# Patient Record
Sex: MALE | Race: OTHER | NOT HISPANIC OR LATINO
[De-identification: names, ages, dates, MRNs, and addresses within clinical notes are randomized per-mention and may not be internally consistent; named-entity substitution may affect disease eponyms.]

---

## 2017-01-19 ENCOUNTER — RX RENEWAL (OUTPATIENT)
Age: 62
End: 2017-01-19

## 2017-01-25 ENCOUNTER — RX RENEWAL (OUTPATIENT)
Age: 62
End: 2017-01-25

## 2017-03-17 ENCOUNTER — RX RENEWAL (OUTPATIENT)
Age: 62
End: 2017-03-17

## 2017-06-08 ENCOUNTER — CLINICAL ADVICE (OUTPATIENT)
Age: 62
End: 2017-06-08

## 2017-06-25 ENCOUNTER — APPOINTMENT (OUTPATIENT)
Dept: MRI IMAGING | Facility: HOSPITAL | Age: 62
End: 2017-06-25

## 2017-06-25 ENCOUNTER — OUTPATIENT (OUTPATIENT)
Dept: OUTPATIENT SERVICES | Facility: HOSPITAL | Age: 62
LOS: 1 days | End: 2017-06-25
Payer: COMMERCIAL

## 2017-06-25 DIAGNOSIS — D35.2 BENIGN NEOPLASM OF PITUITARY GLAND: ICD-10-CM

## 2017-06-25 DIAGNOSIS — R22.9 LOCALIZED SWELLING, MASS AND LUMP, UNSPECIFIED: ICD-10-CM

## 2017-06-25 PROCEDURE — 72146 MRI CHEST SPINE W/O DYE: CPT

## 2017-06-25 PROCEDURE — 72141 MRI NECK SPINE W/O DYE: CPT

## 2017-07-11 ENCOUNTER — APPOINTMENT (OUTPATIENT)
Dept: INTERNAL MEDICINE | Facility: CLINIC | Age: 62
End: 2017-07-11

## 2017-07-11 ENCOUNTER — NON-APPOINTMENT (OUTPATIENT)
Age: 62
End: 2017-07-11

## 2017-07-11 VITALS
OXYGEN SATURATION: 98 % | DIASTOLIC BLOOD PRESSURE: 80 MMHG | SYSTOLIC BLOOD PRESSURE: 120 MMHG | WEIGHT: 154 LBS | TEMPERATURE: 97.8 F | RESPIRATION RATE: 18 BRPM | HEART RATE: 65 BPM | BODY MASS INDEX: 28.34 KG/M2 | HEIGHT: 62 IN

## 2017-07-11 RX ORDER — GABAPENTIN 100 MG/1
100 CAPSULE ORAL
Qty: 90 | Refills: 0 | Status: DISCONTINUED | COMMUNITY
Start: 2017-07-06 | End: 2017-07-11

## 2017-07-12 LAB
25(OH)D3 SERPL-MCNC: 25.2 NG/ML
ALBUMIN SERPL ELPH-MCNC: 4.6 G/DL
ALP BLD-CCNC: 58 U/L
ALT SERPL-CCNC: 32 U/L
ANION GAP SERPL CALC-SCNC: 18 MMOL/L
APPEARANCE: CLEAR
AST SERPL-CCNC: 24 U/L
BASOPHILS # BLD AUTO: 0.01 K/UL
BASOPHILS NFR BLD AUTO: 0.2 %
BILIRUB SERPL-MCNC: 1.4 MG/DL
BILIRUBIN URINE: NEGATIVE
BLOOD URINE: NEGATIVE
BUN SERPL-MCNC: 16 MG/DL
CALCIUM SERPL-MCNC: 9.6 MG/DL
CHLORIDE SERPL-SCNC: 100 MMOL/L
CHOLEST SERPL-MCNC: 154 MG/DL
CHOLEST/HDLC SERPL: 3.9 RATIO
CO2 SERPL-SCNC: 24 MMOL/L
COLOR: YELLOW
CREAT SERPL-MCNC: 0.99 MG/DL
CREAT SPEC-SCNC: 151 MG/DL
EOSINOPHIL # BLD AUTO: 0.17 K/UL
EOSINOPHIL NFR BLD AUTO: 3 %
ERYTHROCYTE [SEDIMENTATION RATE] IN BLOOD BY WESTERGREN METHOD: 2 MM/HR
FOLATE SERPL-MCNC: >20 NG/ML
GGT SERPL-CCNC: 50 U/L
GLUCOSE QUALITATIVE U: NORMAL MG/DL
GLUCOSE SERPL-MCNC: 81 MG/DL
HBA1C MFR BLD HPLC: 4.8 %
HCT VFR BLD CALC: 44 %
HDLC SERPL-MCNC: 40 MG/DL
HGB BLD-MCNC: 14.4 G/DL
IMM GRANULOCYTES NFR BLD AUTO: 0 %
IRON SATN MFR SERPL: 42 %
IRON SERPL-MCNC: 123 UG/DL
KETONES URINE: NEGATIVE
LDLC SERPL CALC-MCNC: 79 MG/DL
LEUKOCYTE ESTERASE URINE: NEGATIVE
LYMPHOCYTES # BLD AUTO: 2.2 K/UL
LYMPHOCYTES NFR BLD AUTO: 38.3 %
MAN DIFF?: NORMAL
MCHC RBC-ENTMCNC: 30.6 PG
MCHC RBC-ENTMCNC: 32.7 GM/DL
MCV RBC AUTO: 93.4 FL
MICROALBUMIN 24H UR DL<=1MG/L-MCNC: 0.5 MG/DL
MICROALBUMIN/CREAT 24H UR-RTO: 3 MG/G
MONOCYTES # BLD AUTO: 0.46 K/UL
MONOCYTES NFR BLD AUTO: 8 %
NEUTROPHILS # BLD AUTO: 2.91 K/UL
NEUTROPHILS NFR BLD AUTO: 50.5 %
NITRITE URINE: NEGATIVE
PH URINE: 6
PLATELET # BLD AUTO: 156 K/UL
POTASSIUM SERPL-SCNC: 4.3 MMOL/L
PROT SERPL-MCNC: 7.1 G/DL
PROTEIN URINE: NEGATIVE MG/DL
PSA FREE FLD-MCNC: 43.8
PSA FREE SERPL-MCNC: 0.32 NG/ML
PSA SERPL-MCNC: 0.73 NG/ML
RBC # BLD: 4.71 M/UL
RBC # FLD: 12.2 %
SODIUM SERPL-SCNC: 142 MMOL/L
SPECIFIC GRAVITY URINE: 1.02
T3 SERPL-MCNC: 102 NG/DL
T4 FREE SERPL-MCNC: 1.2 NG/DL
TIBC SERPL-MCNC: 291 UG/DL
TRIGL SERPL-MCNC: 176 MG/DL
TSH SERPL-ACNC: 1.13 UIU/ML
UIBC SERPL-MCNC: 168 UG/DL
UROBILINOGEN URINE: NORMAL MG/DL
VIT B12 SERPL-MCNC: 443 PG/ML
WBC # FLD AUTO: 5.75 K/UL

## 2017-08-02 LAB — HEMOCCULT STL QL IA: NEGATIVE

## 2017-08-10 ENCOUNTER — APPOINTMENT (OUTPATIENT)
Dept: CARDIOLOGY | Facility: CLINIC | Age: 62
End: 2017-08-10
Payer: COMMERCIAL

## 2017-08-10 VITALS
RESPIRATION RATE: 16 BRPM | DIASTOLIC BLOOD PRESSURE: 79 MMHG | BODY MASS INDEX: 28.52 KG/M2 | OXYGEN SATURATION: 99 % | WEIGHT: 155 LBS | HEIGHT: 62 IN | SYSTOLIC BLOOD PRESSURE: 120 MMHG | HEART RATE: 62 BPM

## 2017-08-10 PROCEDURE — 99204 OFFICE O/P NEW MOD 45 MIN: CPT

## 2017-09-20 ENCOUNTER — OUTPATIENT (OUTPATIENT)
Dept: OUTPATIENT SERVICES | Facility: HOSPITAL | Age: 62
LOS: 1 days | End: 2017-09-20
Payer: COMMERCIAL

## 2017-09-20 ENCOUNTER — APPOINTMENT (OUTPATIENT)
Dept: CARDIOLOGY | Facility: CLINIC | Age: 62
End: 2017-09-20

## 2017-09-20 DIAGNOSIS — Z00.8 ENCOUNTER FOR OTHER GENERAL EXAMINATION: ICD-10-CM

## 2017-09-20 PROCEDURE — 93016 CV STRESS TEST SUPVJ ONLY: CPT

## 2017-09-20 PROCEDURE — 93018 CV STRESS TEST I&R ONLY: CPT

## 2017-09-20 PROCEDURE — 93306 TTE W/DOPPLER COMPLETE: CPT

## 2017-09-20 PROCEDURE — A9500: CPT

## 2017-09-20 PROCEDURE — 78452 HT MUSCLE IMAGE SPECT MULT: CPT

## 2017-09-20 PROCEDURE — 93017 CV STRESS TEST TRACING ONLY: CPT

## 2017-09-20 PROCEDURE — 78452 HT MUSCLE IMAGE SPECT MULT: CPT | Mod: 26

## 2017-09-20 PROCEDURE — 93306 TTE W/DOPPLER COMPLETE: CPT | Mod: 26

## 2017-09-23 ENCOUNTER — RX RENEWAL (OUTPATIENT)
Age: 62
End: 2017-09-23

## 2017-10-05 ENCOUNTER — APPOINTMENT (OUTPATIENT)
Dept: CARDIOLOGY | Facility: CLINIC | Age: 62
End: 2017-10-05

## 2017-10-13 ENCOUNTER — APPOINTMENT (OUTPATIENT)
Dept: CARDIOLOGY | Facility: CLINIC | Age: 62
End: 2017-10-13
Payer: COMMERCIAL

## 2017-10-13 VITALS
SYSTOLIC BLOOD PRESSURE: 122 MMHG | OXYGEN SATURATION: 98 % | WEIGHT: 165 LBS | RESPIRATION RATE: 16 BRPM | BODY MASS INDEX: 30.36 KG/M2 | HEART RATE: 80 BPM | DIASTOLIC BLOOD PRESSURE: 80 MMHG | HEIGHT: 62 IN

## 2017-10-13 PROCEDURE — 93227 XTRNL ECG REC<48 HR R&I: CPT

## 2017-10-13 PROCEDURE — 99214 OFFICE O/P EST MOD 30 MIN: CPT

## 2017-10-13 RX ORDER — AMITRIPTYLINE HYDROCHLORIDE 10 MG/1
10 TABLET, FILM COATED ORAL
Qty: 30 | Refills: 0 | Status: DISCONTINUED | COMMUNITY
Start: 2017-09-07

## 2017-10-13 RX ORDER — AMOXICILLIN 500 MG/1
500 CAPSULE ORAL
Qty: 24 | Refills: 0 | Status: DISCONTINUED | COMMUNITY
Start: 2017-09-19

## 2018-01-10 RX ORDER — MELOXICAM 7.5 MG/1
7.5 TABLET ORAL
Qty: 30 | Refills: 0 | Status: DISCONTINUED | COMMUNITY
Start: 2017-09-17 | End: 2018-01-10

## 2018-01-11 ENCOUNTER — APPOINTMENT (OUTPATIENT)
Dept: INTERNAL MEDICINE | Facility: CLINIC | Age: 63
End: 2018-01-11
Payer: COMMERCIAL

## 2018-01-11 VITALS
DIASTOLIC BLOOD PRESSURE: 80 MMHG | BODY MASS INDEX: 27.23 KG/M2 | WEIGHT: 148 LBS | HEIGHT: 62 IN | OXYGEN SATURATION: 98 % | SYSTOLIC BLOOD PRESSURE: 120 MMHG | TEMPERATURE: 98.3 F | HEART RATE: 78 BPM | RESPIRATION RATE: 16 BRPM

## 2018-01-11 DIAGNOSIS — M19.90 UNSPECIFIED OSTEOARTHRITIS, UNSPECIFIED SITE: ICD-10-CM

## 2018-01-11 PROCEDURE — 99214 OFFICE O/P EST MOD 30 MIN: CPT

## 2018-01-14 LAB
ALBUMIN SERPL ELPH-MCNC: 4.3 G/DL
ALP BLD-CCNC: 54 U/L
ALT SERPL-CCNC: 33 U/L
ANION GAP SERPL CALC-SCNC: 13 MMOL/L
AST SERPL-CCNC: 26 U/L
BASOPHILS # BLD AUTO: 0.02 K/UL
BASOPHILS NFR BLD AUTO: 0.4 %
BILIRUB SERPL-MCNC: 1 MG/DL
BUN SERPL-MCNC: 20 MG/DL
CALCIUM SERPL-MCNC: 9.4 MG/DL
CHLORIDE SERPL-SCNC: 102 MMOL/L
CHOLEST SERPL-MCNC: 150 MG/DL
CHOLEST/HDLC SERPL: 3.5 RATIO
CO2 SERPL-SCNC: 25 MMOL/L
CREAT SERPL-MCNC: 1.05 MG/DL
EOSINOPHIL # BLD AUTO: 0.11 K/UL
EOSINOPHIL NFR BLD AUTO: 2.3 %
GGT SERPL-CCNC: 44 U/L
GLUCOSE SERPL-MCNC: 103 MG/DL
HBA1C MFR BLD HPLC: 4.4 %
HCT VFR BLD CALC: 44.8 %
HDLC SERPL-MCNC: 43 MG/DL
HGB BLD-MCNC: 14.8 G/DL
IMM GRANULOCYTES NFR BLD AUTO: 0.2 %
LDLC SERPL CALC-MCNC: 90 MG/DL
LYMPHOCYTES # BLD AUTO: 1.74 K/UL
LYMPHOCYTES NFR BLD AUTO: 37 %
MAN DIFF?: NORMAL
MCHC RBC-ENTMCNC: 31.4 PG
MCHC RBC-ENTMCNC: 33 GM/DL
MCV RBC AUTO: 94.9 FL
MONOCYTES # BLD AUTO: 0.46 K/UL
MONOCYTES NFR BLD AUTO: 9.8 %
NEUTROPHILS # BLD AUTO: 2.36 K/UL
NEUTROPHILS NFR BLD AUTO: 50.3 %
PLATELET # BLD AUTO: 136 K/UL
POTASSIUM SERPL-SCNC: 4.9 MMOL/L
PROT SERPL-MCNC: 6.6 G/DL
RBC # BLD: 4.72 M/UL
RBC # FLD: 11.9 %
SODIUM SERPL-SCNC: 140 MMOL/L
TRIGL SERPL-MCNC: 87 MG/DL
WBC # FLD AUTO: 4.7 K/UL

## 2018-03-21 ENCOUNTER — CLINICAL ADVICE (OUTPATIENT)
Age: 63
End: 2018-03-21

## 2018-03-22 ENCOUNTER — RX RENEWAL (OUTPATIENT)
Age: 63
End: 2018-03-22

## 2018-03-28 ENCOUNTER — APPOINTMENT (OUTPATIENT)
Dept: ORTHOPEDIC SURGERY | Facility: CLINIC | Age: 63
End: 2018-03-28
Payer: COMMERCIAL

## 2018-03-28 VITALS
BODY MASS INDEX: 27.6 KG/M2 | HEIGHT: 62 IN | SYSTOLIC BLOOD PRESSURE: 119 MMHG | WEIGHT: 150 LBS | DIASTOLIC BLOOD PRESSURE: 76 MMHG | HEART RATE: 67 BPM

## 2018-03-28 DIAGNOSIS — Z80.9 FAMILY HISTORY OF MALIGNANT NEOPLASM, UNSPECIFIED: ICD-10-CM

## 2018-03-28 DIAGNOSIS — Z82.61 FAMILY HISTORY OF ARTHRITIS: ICD-10-CM

## 2018-03-28 PROCEDURE — 99204 OFFICE O/P NEW MOD 45 MIN: CPT

## 2018-04-07 ENCOUNTER — APPOINTMENT (OUTPATIENT)
Dept: ORTHOPEDIC SURGERY | Facility: CLINIC | Age: 63
End: 2018-04-07
Payer: COMMERCIAL

## 2018-04-07 VITALS
BODY MASS INDEX: 27.6 KG/M2 | WEIGHT: 150 LBS | HEART RATE: 55 BPM | SYSTOLIC BLOOD PRESSURE: 123 MMHG | DIASTOLIC BLOOD PRESSURE: 79 MMHG | HEIGHT: 62 IN

## 2018-04-07 PROCEDURE — 72040 X-RAY EXAM NECK SPINE 2-3 VW: CPT

## 2018-04-07 PROCEDURE — 72100 X-RAY EXAM L-S SPINE 2/3 VWS: CPT

## 2018-04-07 PROCEDURE — 99214 OFFICE O/P EST MOD 30 MIN: CPT

## 2018-04-16 ENCOUNTER — RX RENEWAL (OUTPATIENT)
Age: 63
End: 2018-04-16

## 2018-05-23 ENCOUNTER — CLINICAL ADVICE (OUTPATIENT)
Age: 63
End: 2018-05-23

## 2018-06-04 ENCOUNTER — APPOINTMENT (OUTPATIENT)
Dept: NEUROLOGY | Facility: CLINIC | Age: 63
End: 2018-06-04

## 2018-06-06 ENCOUNTER — APPOINTMENT (OUTPATIENT)
Dept: ORTHOPEDIC SURGERY | Facility: CLINIC | Age: 63
End: 2018-06-06

## 2018-06-06 ENCOUNTER — APPOINTMENT (OUTPATIENT)
Dept: ORTHOPEDIC SURGERY | Facility: CLINIC | Age: 63
End: 2018-06-06
Payer: COMMERCIAL

## 2018-06-06 VITALS
HEART RATE: 60 BPM | HEIGHT: 62 IN | WEIGHT: 150 LBS | DIASTOLIC BLOOD PRESSURE: 78 MMHG | BODY MASS INDEX: 27.6 KG/M2 | SYSTOLIC BLOOD PRESSURE: 121 MMHG

## 2018-06-06 DIAGNOSIS — G56.00 CARPAL TUNNEL SYNDROME, UNSPECIFIED UPPER LIMB: ICD-10-CM

## 2018-06-06 PROCEDURE — 99214 OFFICE O/P EST MOD 30 MIN: CPT

## 2018-06-08 ENCOUNTER — APPOINTMENT (OUTPATIENT)
Dept: ORTHOPEDIC SURGERY | Facility: CLINIC | Age: 63
End: 2018-06-08

## 2018-06-10 LAB
25(OH)D3 SERPL-MCNC: 30.6 NG/ML
ALBUMIN SERPL ELPH-MCNC: 4.3 G/DL
ALP BLD-CCNC: 50 U/L
ALT SERPL-CCNC: 35 U/L
ANION GAP SERPL CALC-SCNC: 12 MMOL/L
APPEARANCE: CLEAR
AST SERPL-CCNC: 28 U/L
BASOPHILS # BLD AUTO: 0.02 K/UL
BASOPHILS NFR BLD AUTO: 0.4 %
BILIRUB SERPL-MCNC: 0.9 MG/DL
BILIRUBIN URINE: NEGATIVE
BLOOD URINE: NEGATIVE
BUN SERPL-MCNC: 19 MG/DL
CALCIUM SERPL-MCNC: 9.5 MG/DL
CHLORIDE SERPL-SCNC: 103 MMOL/L
CHOLEST SERPL-MCNC: 186 MG/DL
CHOLEST/HDLC SERPL: 4.8 RATIO
CO2 SERPL-SCNC: 25 MMOL/L
COLOR: YELLOW
CREAT SERPL-MCNC: 0.95 MG/DL
CREAT SPEC-SCNC: 64 MG/DL
EOSINOPHIL # BLD AUTO: 0.11 K/UL
EOSINOPHIL NFR BLD AUTO: 2.2 %
ERYTHROCYTE [SEDIMENTATION RATE] IN BLOOD BY WESTERGREN METHOD: 2 MM/HR
FOLATE SERPL-MCNC: 12.6 NG/ML
GGT SERPL-CCNC: 42 U/L
GLUCOSE QUALITATIVE U: NEGATIVE MG/DL
GLUCOSE SERPL-MCNC: 92 MG/DL
HBA1C MFR BLD HPLC: 4.8 %
HCT VFR BLD CALC: 45.1 %
HCV AB SER QL: NONREACTIVE
HCV S/CO RATIO: 0.16 S/CO
HDLC SERPL-MCNC: 39 MG/DL
HGB BLD-MCNC: 15 G/DL
IMM GRANULOCYTES NFR BLD AUTO: 0.2 %
IRON SATN MFR SERPL: 40 %
IRON SERPL-MCNC: 106 UG/DL
KETONES URINE: NEGATIVE
LDLC SERPL CALC-MCNC: 124 MG/DL
LEUKOCYTE ESTERASE URINE: NEGATIVE
LYMPHOCYTES # BLD AUTO: 1.51 K/UL
LYMPHOCYTES NFR BLD AUTO: 30.6 %
MAN DIFF?: NORMAL
MCHC RBC-ENTMCNC: 31.5 PG
MCHC RBC-ENTMCNC: 33.3 GM/DL
MCV RBC AUTO: 94.7 FL
MICROALBUMIN 24H UR DL<=1MG/L-MCNC: <0.3 MG/DL
MICROALBUMIN/CREAT 24H UR-RTO: NORMAL
MONOCYTES # BLD AUTO: 0.43 K/UL
MONOCYTES NFR BLD AUTO: 8.7 %
NEUTROPHILS # BLD AUTO: 2.85 K/UL
NEUTROPHILS NFR BLD AUTO: 57.9 %
NITRITE URINE: NEGATIVE
PH URINE: 6
PLATELET # BLD AUTO: 134 K/UL
POTASSIUM SERPL-SCNC: 5 MMOL/L
PROT SERPL-MCNC: 6.9 G/DL
PROTEIN URINE: NEGATIVE MG/DL
PSA FREE FLD-MCNC: 39.8
PSA FREE SERPL-MCNC: 0.35 NG/ML
PSA SERPL-MCNC: 0.88 NG/ML
RBC # BLD: 4.76 M/UL
RBC # FLD: 11.9 %
SODIUM SERPL-SCNC: 140 MMOL/L
SPECIFIC GRAVITY URINE: 1.01
T3 SERPL-MCNC: 90 NG/DL
T4 FREE SERPL-MCNC: 1.3 NG/DL
TIBC SERPL-MCNC: 264 UG/DL
TRIGL SERPL-MCNC: 113 MG/DL
TSH SERPL-ACNC: 1.14 UIU/ML
UIBC SERPL-MCNC: 158 UG/DL
UROBILINOGEN URINE: NEGATIVE MG/DL
VIT B12 SERPL-MCNC: 371 PG/ML
WBC # FLD AUTO: 4.93 K/UL

## 2018-06-15 ENCOUNTER — RX RENEWAL (OUTPATIENT)
Age: 63
End: 2018-06-15

## 2018-06-18 ENCOUNTER — APPOINTMENT (OUTPATIENT)
Dept: ORTHOPEDIC SURGERY | Facility: CLINIC | Age: 63
End: 2018-06-18
Payer: COMMERCIAL

## 2018-06-18 VITALS
BODY MASS INDEX: 27.05 KG/M2 | WEIGHT: 147 LBS | HEIGHT: 62 IN | HEART RATE: 60 BPM | DIASTOLIC BLOOD PRESSURE: 75 MMHG | SYSTOLIC BLOOD PRESSURE: 117 MMHG

## 2018-06-18 PROCEDURE — 99213 OFFICE O/P EST LOW 20 MIN: CPT

## 2018-07-20 ENCOUNTER — APPOINTMENT (OUTPATIENT)
Dept: INTERNAL MEDICINE | Facility: CLINIC | Age: 63
End: 2018-07-20
Payer: COMMERCIAL

## 2018-07-20 VITALS
OXYGEN SATURATION: 98 % | TEMPERATURE: 98.2 F | BODY MASS INDEX: 27.05 KG/M2 | RESPIRATION RATE: 16 BRPM | HEIGHT: 62 IN | SYSTOLIC BLOOD PRESSURE: 120 MMHG | DIASTOLIC BLOOD PRESSURE: 80 MMHG | HEART RATE: 65 BPM | WEIGHT: 147 LBS

## 2018-07-20 PROCEDURE — 99214 OFFICE O/P EST MOD 30 MIN: CPT

## 2018-07-20 RX ORDER — CHLORHEXIDINE GLUCONATE, 0.12% ORAL RINSE 1.2 MG/ML
0.12 SOLUTION DENTAL
Qty: 473 | Refills: 0 | Status: DISCONTINUED | COMMUNITY
Start: 2018-02-25 | End: 2018-07-20

## 2018-07-20 NOTE — HISTORY OF PRESENT ILLNESS
[Cold Symptoms] : cold symptoms [Moderate] : moderate [___ Days ago] : [unfilled] days ago [Constant] : constant [Congestion] : congestion [Cough] : cough [Sore Throat] : sore throat [Chills] : chills [Fatigue] : fatigue [Fever] : fever [Rest] : rest [Activity] : with activity [Worsening] : worsening [Wheezing] : no wheezing [Anorexia] : no anorexia [Shortness Of Breath] : no shortness of breath [Earache] : no earache [Headache] : no headache

## 2018-07-20 NOTE — REVIEW OF SYSTEMS
[Sore Throat] : sore throat [Cough] : cough [Negative] : Heme/Lymph [Shortness Of Breath] : no shortness of breath [Wheezing] : no wheezing

## 2018-07-20 NOTE — ASSESSMENT
[FreeTextEntry1] : 63 year old male found to have stable Hypercholesterolemia, GERD, Vitamin D Deficiency,with the current regimen, diet and life style modifications, as counseled. Prior results reviewed and discussed with the patient during today's examination. Plan as ordered.\par Current symptoms are consistent with Acute Bronchitis , prescription management and further followup as ordered. If symptoms get worse, consider adding Prednisone, as counseled.\par \par

## 2018-07-20 NOTE — HEALTH RISK ASSESSMENT
[No falls in past year] : Patient reported no falls in the past year [0] : 2) Feeling down, depressed, or hopeless: Not at all (0) [Discussed at today's visit] : Advance Directives Discussed at today's visit [Fully functional (bathing, dressing, toileting, transferring, walking, feeding)] : Fully functional (bathing, dressing, toileting, transferring, walking, feeding) [Fully functional (using the telephone, shopping, preparing meals, housekeeping, doing laundry, using] : Fully functional and needs no help or supervision to perform IADLs (using the telephone, shopping, preparing meals, housekeeping, doing laundry, using transportation, managing medications and managing finances) [] : No [de-identified] : ORTHO [[id number]] : 0

## 2018-07-28 ENCOUNTER — APPOINTMENT (OUTPATIENT)
Dept: INTERNAL MEDICINE | Facility: CLINIC | Age: 63
End: 2018-07-28

## 2018-09-21 ENCOUNTER — RX RENEWAL (OUTPATIENT)
Age: 63
End: 2018-09-21

## 2018-09-28 ENCOUNTER — EMERGENCY (EMERGENCY)
Facility: HOSPITAL | Age: 63
LOS: 1 days | Discharge: HOME | End: 2018-09-28
Attending: EMERGENCY MEDICINE | Admitting: EMERGENCY MEDICINE
Payer: COMMERCIAL

## 2018-09-28 VITALS
RESPIRATION RATE: 18 BRPM | SYSTOLIC BLOOD PRESSURE: 142 MMHG | OXYGEN SATURATION: 99 % | DIASTOLIC BLOOD PRESSURE: 92 MMHG | TEMPERATURE: 98 F | HEART RATE: 62 BPM

## 2018-09-28 VITALS
TEMPERATURE: 98 F | DIASTOLIC BLOOD PRESSURE: 88 MMHG | HEART RATE: 69 BPM | OXYGEN SATURATION: 98 % | RESPIRATION RATE: 16 BRPM | SYSTOLIC BLOOD PRESSURE: 170 MMHG

## 2018-09-28 PROCEDURE — 99283 EMERGENCY DEPT VISIT LOW MDM: CPT

## 2018-09-28 RX ORDER — ACETAMINOPHEN 500 MG
650 TABLET ORAL ONCE
Qty: 0 | Refills: 0 | Status: COMPLETED | OUTPATIENT
Start: 2018-09-28 | End: 2018-09-28

## 2018-09-28 RX ORDER — KETOROLAC TROMETHAMINE 30 MG/ML
30 SYRINGE (ML) INJECTION ONCE
Qty: 0 | Refills: 0 | Status: DISCONTINUED | OUTPATIENT
Start: 2018-09-28 | End: 2018-09-28

## 2018-09-28 RX ADMIN — Medication 40 MILLIGRAM(S): at 17:25

## 2018-09-28 RX ADMIN — Medication 650 MILLIGRAM(S): at 16:01

## 2018-09-28 RX ADMIN — Medication 30 MILLIGRAM(S): at 16:01

## 2018-09-28 NOTE — ED PROVIDER NOTE - CARE PLAN
Principal Discharge DX:	Back pain  Assessment and plan of treatment:	Follow up with your Primary Medical Doctor in 1-2 days.  Follow up with your Neurologist and pain management doctor in 1-2 days.  Heat to back.  Take Prednisone 40mg orally once a day x 4 days next dose tomorrow 9/29.  Take Tylenol 650mg orally every 6 hours as needed for pain.  Return to the ER for any persistent/worsening or new symptoms weakness, numbness, fecal/urinary incontinence or any concerning symptoms.

## 2018-09-28 NOTE — ED PROVIDER NOTE - MEDICAL DECISION MAKING DETAILS
63y M with low back pain. Nml neuro exam. Will give pain medications. Need outpt follow up. Pt also endorses high BP and needs follow up. Will call neurologist Dr. Valeriy Long

## 2018-09-28 NOTE — ED PROVIDER NOTE - PLAN OF CARE
Follow up with your Primary Medical Doctor in 1-2 days.  Follow up with your Neurologist and pain management doctor in 1-2 days.  Heat to back.  Take Prednisone 40mg orally once a day x 4 days next dose tomorrow 9/29.  Take Tylenol 650mg orally every 6 hours as needed for pain.  Return to the ER for any persistent/worsening or new symptoms weakness, numbness, fecal/urinary incontinence or any concerning symptoms.

## 2018-09-28 NOTE — ED PROVIDER NOTE - MUSCULOSKELETAL MINIMAL EXAM
Ambulatory. Back and neck nontender to palpation. Distal neurovascularly intact. Pt has circular marks in back from cupping from acupuncturist. Straight leg raise nml

## 2018-09-28 NOTE — ED PROVIDER NOTE - OBJECTIVE STATEMENT
63y M with PMHx HLD presents to the ED for low back pain since Monday. Having difficulty ambulating. No trauma. Diagnosed with pinched nerve previously and has had numbness and weakness in left hand since last year. No smoke and social drinker. Family hx of arthritis. No bowel or urinary dysfunction 63y M with PMHx HLD presents to the ED for low back pain since Monday. Having difficulty ambulating. No trauma. Diagnosed with pinched nerve from MRI previously and has had numbness and weakness in left hand since last year. No smoke and social drinker. Family hx of arthritis. No bowel or urinary dysfunction 63y M with PMHx HLD presents to the ED for low back pain since Monday. Having difficulty ambulating. No trauma. Diagnosed with pinched nerve from MRI previously and has had numbness and weakness in left hand since last year. No smoke and social drinker. Family hx of arthritis. No bowel or urinary dysfunction. Previous MRI from May shows canal narrowing in the cervical spine. Lumbar spine MRI shows spinal stenosis. Left elbow MRI which shows mild arthrosis

## 2018-09-28 NOTE — ED PROVIDER NOTE - PROGRESS NOTE DETAILS
Dr. Long paged. Waiting for call back ED Attending: Kobi  Pt signed out to me from Dr. Matute to f/u on pain and reassess.  Pt feeling somewhat better, still having pain down right leg and paresthesias.  RLE extension limited by pain, but drossly normal. Otherwise denies any specific neuro deficits.  RLE pain is new for him despite h/o back issues.  We recc MRI/CDU but pt declined.  States he can tolerate sx and will c/w Tylenol for now, also rx prednisone here.  He is scheduled to see his Physiatrist Monday at 9:30 and will be able to get MRI at same facility. Understands warming signs for return.  DC instructions given by LUIS ENRIQUE Castillo. LUIS ENRIQUE Castillo:(QUJOSE DUDLEY) pt feels slightly improved ambulating without assistance.  Pt offered and declined admission to CDU for MRI; pt has a follow up appointment on Monday with his doctor.  Rx for Prednisone sent to pt's pharmacy.  Discharge reviewed and discussed with patient.  Pt given strict return instructions.

## 2018-10-10 ENCOUNTER — MOBILE ON CALL (OUTPATIENT)
Age: 63
End: 2018-10-10

## 2018-10-22 ENCOUNTER — APPOINTMENT (OUTPATIENT)
Dept: ORTHOPEDIC SURGERY | Facility: CLINIC | Age: 63
End: 2018-10-22
Payer: COMMERCIAL

## 2018-10-22 VITALS — DIASTOLIC BLOOD PRESSURE: 84 MMHG | SYSTOLIC BLOOD PRESSURE: 139 MMHG | HEART RATE: 87 BPM

## 2018-10-22 DIAGNOSIS — G95.9 DISEASE OF SPINAL CORD, UNSPECIFIED: ICD-10-CM

## 2018-10-22 PROCEDURE — 72170 X-RAY EXAM OF PELVIS: CPT | Mod: 59

## 2018-10-22 PROCEDURE — 72110 X-RAY EXAM L-2 SPINE 4/>VWS: CPT

## 2018-10-22 PROCEDURE — 99214 OFFICE O/P EST MOD 30 MIN: CPT

## 2018-10-30 ENCOUNTER — APPOINTMENT (OUTPATIENT)
Dept: ORTHOPEDIC SURGERY | Facility: CLINIC | Age: 63
End: 2018-10-30
Payer: COMMERCIAL

## 2018-10-30 VITALS
WEIGHT: 148 LBS | BODY MASS INDEX: 27.23 KG/M2 | SYSTOLIC BLOOD PRESSURE: 121 MMHG | HEART RATE: 86 BPM | HEIGHT: 62 IN | DIASTOLIC BLOOD PRESSURE: 80 MMHG

## 2018-10-30 PROCEDURE — 99214 OFFICE O/P EST MOD 30 MIN: CPT

## 2018-11-30 ENCOUNTER — APPOINTMENT (OUTPATIENT)
Dept: ORTHOPEDIC SURGERY | Facility: HOSPITAL | Age: 63
End: 2018-11-30
Payer: COMMERCIAL

## 2018-11-30 VITALS
BODY MASS INDEX: 25.61 KG/M2 | WEIGHT: 150 LBS | HEIGHT: 64 IN | SYSTOLIC BLOOD PRESSURE: 110 MMHG | HEART RATE: 76 BPM | OXYGEN SATURATION: 97 % | DIASTOLIC BLOOD PRESSURE: 80 MMHG

## 2018-11-30 DIAGNOSIS — M48.02 SPINAL STENOSIS, CERVICAL REGION: ICD-10-CM

## 2018-11-30 DIAGNOSIS — M54.16 RADICULOPATHY, LUMBAR REGION: ICD-10-CM

## 2018-11-30 DIAGNOSIS — G56.22 LESION OF ULNAR NERVE, LEFT UPPER LIMB: ICD-10-CM

## 2018-11-30 PROCEDURE — 99215 OFFICE O/P EST HI 40 MIN: CPT

## 2018-11-30 RX ORDER — PROMETHAZINE HYDROCHLORIDE AND DEXTROMETHORPHAN HYDROBROMIDE ORAL SOLUTION 15; 6.25 MG/5ML; MG/5ML
6.25-15 SOLUTION ORAL
Qty: 210 | Refills: 2 | Status: DISCONTINUED | COMMUNITY
Start: 2018-07-20 | End: 2018-11-30

## 2018-11-30 RX ORDER — DOXYCYCLINE HYCLATE 100 MG/1
100 CAPSULE ORAL
Qty: 14 | Refills: 0 | Status: DISCONTINUED | COMMUNITY
Start: 2018-07-20 | End: 2018-11-30

## 2018-11-30 RX ORDER — ASPIRIN 81 MG
81 TABLET, DELAYED RELEASE (ENTERIC COATED) ORAL DAILY
Qty: 90 | Refills: 1 | Status: DISCONTINUED | COMMUNITY
Start: 2017-10-13 | End: 2018-11-30

## 2018-12-03 PROBLEM — G56.22 ULNAR NERVE ENTRAPMENT, LEFT: Status: ACTIVE | Noted: 2018-12-03

## 2018-12-03 PROBLEM — M54.16 LEFT LUMBAR RADICULOPATHY: Status: ACTIVE | Noted: 2018-10-22

## 2018-12-03 PROBLEM — M48.02 CERVICAL STENOSIS OF SPINE: Status: ACTIVE | Noted: 2018-04-07

## 2018-12-14 ENCOUNTER — APPOINTMENT (OUTPATIENT)
Dept: ORTHOPEDIC SURGERY | Facility: CLINIC | Age: 63
End: 2018-12-14
Payer: MEDICARE

## 2018-12-14 ENCOUNTER — CLINICAL ADVICE (OUTPATIENT)
Age: 63
End: 2018-12-14

## 2018-12-14 DIAGNOSIS — G56.22 LESION OF ULNAR NERVE, LEFT UPPER LIMB: ICD-10-CM

## 2018-12-14 PROCEDURE — 99214 OFFICE O/P EST MOD 30 MIN: CPT

## 2019-02-04 ENCOUNTER — RX RENEWAL (OUTPATIENT)
Age: 64
End: 2019-02-04

## 2019-02-09 ENCOUNTER — APPOINTMENT (OUTPATIENT)
Dept: INTERNAL MEDICINE | Facility: CLINIC | Age: 64
End: 2019-02-09
Payer: MEDICARE

## 2019-02-09 VITALS
SYSTOLIC BLOOD PRESSURE: 130 MMHG | HEIGHT: 64 IN | TEMPERATURE: 98.9 F | WEIGHT: 150 LBS | BODY MASS INDEX: 25.61 KG/M2 | DIASTOLIC BLOOD PRESSURE: 80 MMHG | OXYGEN SATURATION: 98 % | HEART RATE: 70 BPM | RESPIRATION RATE: 16 BRPM

## 2019-02-09 PROCEDURE — 99214 OFFICE O/P EST MOD 30 MIN: CPT

## 2019-02-09 NOTE — ASSESSMENT
[FreeTextEntry1] : 64 year old male found to have stable Hypercholesterolemia, Thrombocytopenia, Cervical Radiculopathy, GERD, Arthritis,with the current regimen, diet and life style modifications, as counseled. Prior results reviewed and discussed with the patient during today's examination. Plan as ordered.\par Current symptoms are consistent with symptomatic Allergic Rhinosinusitis and Reactive Airway Disease , prescription management and further PULM followup as ordered.\par

## 2019-02-09 NOTE — HISTORY OF PRESENT ILLNESS
[de-identified] : 64 year old  male patient with history of stable Hypercholesterolemia, Allergic Rhinosinusitis, Reactive Airway Disease, Thrombocytopenia, Cervical Radiculopathy, GERD, Arthritis, history as stated, presented for follow up examination with c/o cough, associated with minimal chest congestion, no wheezing, for few days, cough increases upon talking. Patient is compliant with all medications. Denies shortness of breath, chest pain or abdominal pains at this time. ROS as stated.\par

## 2019-05-03 ENCOUNTER — RX RENEWAL (OUTPATIENT)
Age: 64
End: 2019-05-03

## 2019-06-06 ENCOUNTER — CLINICAL ADVICE (OUTPATIENT)
Age: 64
End: 2019-06-06

## 2019-08-11 NOTE — COUNSELING
[Potential consequences of obesity discussed] : Potential consequences of obesity discussed [Good understanding] : Patient has a good understanding of disease, goals and obesity follow-up plan

## 2019-08-13 ENCOUNTER — NON-APPOINTMENT (OUTPATIENT)
Age: 64
End: 2019-08-13

## 2019-08-13 ENCOUNTER — APPOINTMENT (OUTPATIENT)
Dept: INTERNAL MEDICINE | Facility: CLINIC | Age: 64
End: 2019-08-13
Payer: MEDICARE

## 2019-08-13 VITALS
OXYGEN SATURATION: 98 % | TEMPERATURE: 98.1 F | HEART RATE: 64 BPM | SYSTOLIC BLOOD PRESSURE: 120 MMHG | RESPIRATION RATE: 16 BRPM | WEIGHT: 151 LBS | HEIGHT: 64 IN | BODY MASS INDEX: 25.78 KG/M2 | DIASTOLIC BLOOD PRESSURE: 80 MMHG

## 2019-08-13 PROCEDURE — G0402 INITIAL PREVENTIVE EXAM: CPT

## 2019-08-13 PROCEDURE — G0403: CPT

## 2019-08-13 PROCEDURE — G0439: CPT

## 2019-08-13 PROCEDURE — 93000 ELECTROCARDIOGRAM COMPLETE: CPT

## 2019-08-13 RX ORDER — AMOXICILLIN AND CLAVULANATE POTASSIUM 500; 125 MG/1; MG/1
500-125 TABLET, FILM COATED ORAL TWICE DAILY
Qty: 14 | Refills: 0 | Status: DISCONTINUED | COMMUNITY
Start: 2019-02-09 | End: 2019-08-13

## 2019-08-13 RX ORDER — PREDNISONE 10 MG/1
10 TABLET ORAL
Qty: 21 | Refills: 0 | Status: DISCONTINUED | COMMUNITY
Start: 2019-02-09 | End: 2019-08-13

## 2019-08-13 NOTE — HISTORY OF PRESENT ILLNESS
[de-identified] : 64 year old male patient with history of stable Reactive Airway Disease, Arthritis, Vitamin D Deficiency, GERD, Cervical Radiculopathy, Allergic Rhinosinusitis, Thrombocytopenia, history as stated, presented for an annual preventative examination. Patient denies any associated symptoms of shortness of breath, chest pain, abdominal pain at this time.\par

## 2019-08-13 NOTE — ASSESSMENT
[FreeTextEntry1] : 64 year old male found to have stable Reactive Airway Disease, Arthritis, Vitamin D Deficiency, GERD, Cervical Radiculopathy, Allergic Rhinosinusitis, Thrombocytopenia,with the current regimen, diet and life style modifications, as counseled. Prior results reviewed and discussed with the patient during today's examination. Plan as ordered.\par EKG showed NSR at the rate of 62 BPM, LAD, known LAHB, non-sp ST-T changes noted.\par

## 2019-08-13 NOTE — HEALTH RISK ASSESSMENT
[No] : In the past 12 months have you used drugs other than those required for medical reasons? No [No falls in past year] : Patient reported no falls in the past year [0] : 2) Feeling down, depressed, or hopeless: Not at all (0) [HIV test declined] : HIV test declined [None] : None [Feels Safe at Home] : Feels safe at home [Fully functional (bathing, dressing, toileting, transferring, walking, feeding)] : Fully functional (bathing, dressing, toileting, transferring, walking, feeding) [Fully functional (using the telephone, shopping, preparing meals, housekeeping, doing laundry, using] : Fully functional and needs no help or supervision to perform IADLs (using the telephone, shopping, preparing meals, housekeeping, doing laundry, using transportation, managing medications and managing finances) [Smoke Detector] : smoke detector [Carbon Monoxide Detector] : carbon monoxide detector [Seat Belt] :  uses seat belt [Sunscreen] : uses sunscreen [With Patient/Caregiver] : With Patient/Caregiver [FreeTextEntry1] : Check up\par  [] : No [de-identified] : None [[id number]] : 0 [Change in mental status noted] : No change in mental status noted [Reports changes in hearing] : Reports no changes in hearing [Reports changes in dental health] : Reports no changes in dental health [Reports changes in vision] : Reports no changes in vision [ColonoscopyComments] : As ordered for today. [HepatitisCDate] : 06/18 [HepatitisCComments] : Negative [AdvancecareDate] : 08/19

## 2019-08-16 LAB
25(OH)D3 SERPL-MCNC: 16.6 NG/ML
ALBUMIN SERPL ELPH-MCNC: 4.7 G/DL
ALP BLD-CCNC: 59 U/L
ALT SERPL-CCNC: 30 U/L
ANION GAP SERPL CALC-SCNC: 13 MMOL/L
APPEARANCE: CLEAR
AST SERPL-CCNC: 20 U/L
BASOPHILS # BLD AUTO: 0.03 K/UL
BASOPHILS NFR BLD AUTO: 0.6 %
BILIRUB SERPL-MCNC: 0.8 MG/DL
BILIRUBIN URINE: NEGATIVE
BLOOD URINE: NEGATIVE
BUN SERPL-MCNC: 16 MG/DL
CALCIUM SERPL-MCNC: 9.9 MG/DL
CHLORIDE SERPL-SCNC: 101 MMOL/L
CHOLEST SERPL-MCNC: 181 MG/DL
CHOLEST/HDLC SERPL: 5.3 RATIO
CO2 SERPL-SCNC: 26 MMOL/L
COLOR: YELLOW
CREAT SERPL-MCNC: 0.97 MG/DL
CREAT SPEC-SCNC: 136 MG/DL
EOSINOPHIL # BLD AUTO: 0.16 K/UL
EOSINOPHIL NFR BLD AUTO: 3 %
ERYTHROCYTE [SEDIMENTATION RATE] IN BLOOD BY WESTERGREN METHOD: 2 MM/HR
ESTIMATED AVERAGE GLUCOSE: 91 MG/DL
FOLATE SERPL-MCNC: 17.8 NG/ML
GGT SERPL-CCNC: 49 U/L
GLUCOSE QUALITATIVE U: NEGATIVE
GLUCOSE SERPL-MCNC: 109 MG/DL
HBA1C MFR BLD HPLC: 4.8 %
HCT VFR BLD CALC: 47.7 %
HDLC SERPL-MCNC: 34 MG/DL
HGB BLD-MCNC: 15.8 G/DL
IMM GRANULOCYTES NFR BLD AUTO: 0.4 %
IRON SATN MFR SERPL: 37 %
IRON SERPL-MCNC: 97 UG/DL
KETONES URINE: NEGATIVE
LDLC SERPL CALC-MCNC: 88 MG/DL
LEUKOCYTE ESTERASE URINE: NEGATIVE
LYMPHOCYTES # BLD AUTO: 1.51 K/UL
LYMPHOCYTES NFR BLD AUTO: 28 %
MAN DIFF?: NORMAL
MCHC RBC-ENTMCNC: 31.5 PG
MCHC RBC-ENTMCNC: 33.1 GM/DL
MCV RBC AUTO: 95.2 FL
MICROALBUMIN 24H UR DL<=1MG/L-MCNC: <1.2 MG/DL
MICROALBUMIN/CREAT 24H UR-RTO: NORMAL MG/G
MONOCYTES # BLD AUTO: 0.5 K/UL
MONOCYTES NFR BLD AUTO: 9.3 %
NEUTROPHILS # BLD AUTO: 3.17 K/UL
NEUTROPHILS NFR BLD AUTO: 58.7 %
NITRITE URINE: NEGATIVE
PH URINE: 5.5
PLATELET # BLD AUTO: 131 K/UL
POTASSIUM SERPL-SCNC: 5.3 MMOL/L
PROT SERPL-MCNC: 6.9 G/DL
PROTEIN URINE: NEGATIVE
PSA FREE FLD-MCNC: 52 %
PSA FREE SERPL-MCNC: 0.44 NG/ML
PSA SERPL-MCNC: 0.85 NG/ML
RBC # BLD: 5.01 M/UL
RBC # FLD: 11.5 %
SODIUM SERPL-SCNC: 140 MMOL/L
SPECIFIC GRAVITY URINE: 1.02
T3 SERPL-MCNC: 102 NG/DL
T4 FREE SERPL-MCNC: 1.1 NG/DL
TIBC SERPL-MCNC: 260 UG/DL
TRIGL SERPL-MCNC: 296 MG/DL
TSH SERPL-ACNC: 1.84 UIU/ML
UIBC SERPL-MCNC: 163 UG/DL
UROBILINOGEN URINE: NORMAL
VIT B12 SERPL-MCNC: 419 PG/ML
WBC # FLD AUTO: 5.39 K/UL

## 2019-08-27 LAB — HEMOCCULT STL QL IA: NEGATIVE

## 2019-11-06 ENCOUNTER — MEDICATION RENEWAL (OUTPATIENT)
Age: 64
End: 2019-11-06

## 2019-12-09 ENCOUNTER — APPOINTMENT (OUTPATIENT)
Dept: INTERNAL MEDICINE | Facility: CLINIC | Age: 64
End: 2019-12-09
Payer: MEDICARE

## 2019-12-09 VITALS
TEMPERATURE: 98.3 F | OXYGEN SATURATION: 98 % | DIASTOLIC BLOOD PRESSURE: 80 MMHG | HEIGHT: 64 IN | BODY MASS INDEX: 25.78 KG/M2 | SYSTOLIC BLOOD PRESSURE: 130 MMHG | RESPIRATION RATE: 16 BRPM | HEART RATE: 79 BPM | WEIGHT: 151 LBS

## 2019-12-09 PROCEDURE — 99214 OFFICE O/P EST MOD 30 MIN: CPT

## 2019-12-09 NOTE — HEALTH RISK ASSESSMENT
[No falls in past year] : Patient reported no falls in the past year [0] : 1) Little interest or pleasure doing things: Not at all (0) [] : No [de-identified] : ORTHO [[id number]] : 0

## 2019-12-09 NOTE — HISTORY OF PRESENT ILLNESS
[___ Days ago] : [unfilled] days ago [Moderate] : moderate [Cough] : cough [Congestion] : congestion [Constant] : constant [Chills] : chills [Fatigue] : fatigue [Rest] : rest [Fever] : fever [Activity] : with activity [Worsening] : worsening [Anorexia] : no anorexia [Sore Throat] : no sore throat [Wheezing] : no wheezing [Earache] : no earache [Headache] : no headache [Shortness Of Breath] : no shortness of breath [de-identified] : 64 year old  male patient with history of stable Hypercholesterolemia, Allergic Rhinosinusitis, Reactive Airway Disease, Thrombocytopenia, Cervical Radiculopathy, GERD, Arthritis, history as stated, presented for follow up examination with c/o cough. Patient is compliant with all medications. Denies shortness of breath, chest pain or abdominal pains at this time. ROS as stated.\par

## 2019-12-09 NOTE — ASSESSMENT
[FreeTextEntry1] : 64 year old male found to have stable Hypercholesterolemia, Thrombocytopenia, Cervical Radiculopathy, GERD, Arthritis,with the current regimen, diet and life style modifications, as counseled. Prior results reviewed and discussed with the patient during today's examination. Plan as ordered.\par Current symptoms are consistent with symptomatic Allergic Rhinosinusitis and Reactive Airway Disease , prescription management and furtther followup as ordered.\par If symptoms persists, CXR, as counseled.\par

## 2020-02-10 ENCOUNTER — APPOINTMENT (OUTPATIENT)
Dept: INTERNAL MEDICINE | Facility: CLINIC | Age: 65
End: 2020-02-10
Payer: MEDICARE

## 2020-02-10 VITALS
HEART RATE: 65 BPM | RESPIRATION RATE: 16 BRPM | HEIGHT: 64 IN | OXYGEN SATURATION: 97 % | BODY MASS INDEX: 26.12 KG/M2 | WEIGHT: 153 LBS | DIASTOLIC BLOOD PRESSURE: 81 MMHG | TEMPERATURE: 98 F | SYSTOLIC BLOOD PRESSURE: 139 MMHG

## 2020-02-10 PROCEDURE — 99214 OFFICE O/P EST MOD 30 MIN: CPT

## 2020-02-10 RX ORDER — AMOXICILLIN AND CLAVULANATE POTASSIUM 500; 125 MG/1; MG/1
500-125 TABLET, FILM COATED ORAL TWICE DAILY
Qty: 14 | Refills: 0 | Status: DISCONTINUED | COMMUNITY
Start: 2019-12-09 | End: 2020-02-10

## 2020-02-10 NOTE — ASSESSMENT
[FreeTextEntry1] : 65 year old male found to have stable Hypercholesterolemia, Allergic Rhinosinusitis, Thrombocytopenia, Cervical Radiculopathy, GERD, Arthritis,with the current regimen, diet and life style modifications, as counseled. Prior results reviewed and discussed with the patient during today's examination. Plan as ordered.\par

## 2020-02-10 NOTE — HISTORY OF PRESENT ILLNESS
[de-identified] : 65 year old  male patient with history of stable Hypercholesterolemia, Allergic Rhinosinusitis, Thrombocytopenia, Cervical Radiculopathy, GERD, Arthritis, history as stated, presented for follow up examination. Patient is compliant with all medications. Denies shortness of breath, chest pain or abdominal pains at this time. ROS as stated.\par

## 2020-02-10 NOTE — HEALTH RISK ASSESSMENT
[No] : In the past 12 months have you used drugs other than those required for medical reasons? No [No falls in past year] : Patient reported no falls in the past year [0] : 2) Feeling down, depressed, or hopeless: Not at all (0) [] : No [de-identified] : None [[id number]] : 0

## 2020-04-08 ENCOUNTER — APPOINTMENT (OUTPATIENT)
Dept: DERMATOLOGY | Facility: CLINIC | Age: 65
End: 2020-04-08
Payer: MEDICARE

## 2020-04-08 PROCEDURE — 99203 OFFICE O/P NEW LOW 30 MIN: CPT | Mod: 95

## 2020-04-08 NOTE — PHYSICAL EXAM
[FreeTextEntry3] : AAOx3, pleasant, NAD, no visual lymphadenopathy\par hair, scalp, face, nose, eyelids, ears, lips, oropharynx, neck, chest, abdomen, back, right arm, left arm, nails, and hands examined with all normal findings,\par pertinent findings include:\par \par erythema on back of neck; retiuclar

## 2020-04-08 NOTE — ASSESSMENT
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 Risks and benefits of receiving Telehealth services has been discussed with the patient. The patient has been given ample opportunity to discuss any questions regarding [facility]’s telehealth services. All of the patients questions have been answered to satisfaction.\par \par \par

## 2020-04-08 NOTE — HISTORY OF PRESENT ILLNESS
[Home] : at home, [unfilled] , at the time of the visit. [Medical Office: ([facility])___] : at ~his/her~ medical office located in [place] [Patient] : the patient [Self] : self [FreeTextEntry1] : redness on back of neck [de-identified] : 65 year old male with growth on back of neck. not itchy. present for years.

## 2020-04-09 ENCOUNTER — APPOINTMENT (OUTPATIENT)
Dept: DERMATOLOGY | Facility: CLINIC | Age: 65
End: 2020-04-09

## 2020-08-03 ENCOUNTER — NON-APPOINTMENT (OUTPATIENT)
Age: 65
End: 2020-08-03

## 2020-08-03 ENCOUNTER — APPOINTMENT (OUTPATIENT)
Dept: INTERNAL MEDICINE | Facility: CLINIC | Age: 65
End: 2020-08-03
Payer: MEDICARE

## 2020-08-03 VITALS
OXYGEN SATURATION: 97 % | SYSTOLIC BLOOD PRESSURE: 129 MMHG | HEART RATE: 68 BPM | BODY MASS INDEX: 26.29 KG/M2 | TEMPERATURE: 97.9 F | HEIGHT: 64 IN | RESPIRATION RATE: 16 BRPM | DIASTOLIC BLOOD PRESSURE: 82 MMHG | WEIGHT: 154 LBS

## 2020-08-03 DIAGNOSIS — Z00.00 ENCOUNTER FOR GENERAL ADULT MEDICAL EXAMINATION W/OUT ABNORMAL FINDINGS: ICD-10-CM

## 2020-08-03 PROCEDURE — G0439: CPT

## 2020-08-03 PROCEDURE — 93000 ELECTROCARDIOGRAM COMPLETE: CPT

## 2020-08-03 RX ORDER — ASPIRIN 81 MG/1
81 TABLET, CHEWABLE ORAL
Refills: 0 | Status: DISCONTINUED | COMMUNITY
End: 2020-08-03

## 2020-08-03 RX ORDER — SENNOSIDES 8.6 MG
8.6 TABLET ORAL
Refills: 0 | Status: DISCONTINUED | COMMUNITY
End: 2020-08-03

## 2020-08-03 RX ORDER — OMEPRAZOLE 40 MG/1
40 CAPSULE, DELAYED RELEASE ORAL
Qty: 30 | Refills: 5 | Status: DISCONTINUED | COMMUNITY
Start: 2018-11-26 | End: 2020-08-03

## 2020-08-03 RX ORDER — MAGNESIUM L-LACTATE 84 MG
TABLET, EXTENDED RELEASE ORAL
Refills: 0 | Status: DISCONTINUED | COMMUNITY
End: 2020-08-03

## 2020-08-03 NOTE — HISTORY OF PRESENT ILLNESS
[de-identified] : 65 year old male patient with history of stable Reactive Airway Disease, Arthritis, Vitamin D Deficiency, GERD, Cervical Radiculopathy, Allergic Rhinosinusitis, Thrombocytopenia, history as stated, presented for an annual preventative examination. Patient denies any associated symptoms of shortness of breath, chest pain, abdominal pain at this time.\par

## 2020-08-03 NOTE — HEALTH RISK ASSESSMENT
[No] : In the past 12 months have you used drugs other than those required for medical reasons? No [No falls in past year] : Patient reported no falls in the past year [0] : 2) Feeling down, depressed, or hopeless: Not at all (0) [HIV test declined] : HIV test declined [Patient declined colonoscopy] : Patient declined colonoscopy [None] : None [Fully functional (using the telephone, shopping, preparing meals, housekeeping, doing laundry, using] : Fully functional and needs no help or supervision to perform IADLs (using the telephone, shopping, preparing meals, housekeeping, doing laundry, using transportation, managing medications and managing finances) [Feels Safe at Home] : Feels safe at home [Fully functional (bathing, dressing, toileting, transferring, walking, feeding)] : Fully functional (bathing, dressing, toileting, transferring, walking, feeding) [Carbon Monoxide Detector] : carbon monoxide detector [Smoke Detector] : smoke detector [Sunscreen] : uses sunscreen [Seat Belt] :  uses seat belt [With Patient/Caregiver] : With Patient/Caregiver [FreeTextEntry1] : Check up\par  [] : No [de-identified] : None [[id number]] : 0 [Reports changes in hearing] : Reports no changes in hearing [Change in mental status noted] : No change in mental status noted [Reports changes in dental health] : Reports no changes in dental health [Reports changes in vision] : Reports no changes in vision [ColonoscopyComments] : As ordered for today. [HepatitisCComments] : Negative [HepatitisCDate] : 06/18 [AdvancecareDate] : 08/20

## 2020-08-03 NOTE — ASSESSMENT
[FreeTextEntry1] : 65 year old male found to have stable Reactive Airway Disease, Arthritis, Vitamin D Deficiency, GERD, Cervical Radiculopathy, Allergic Rhinosinusitis, Thrombocytopenia,with the current regimen, diet and life style modifications, as counseled. Prior results reviewed and discussed with the patient during today's examination. Plan as ordered.\par EKG showed known sinus bradycardia at the rate of 59 BPM, LAD, non-sp ST-T changes noted.\par

## 2020-08-04 LAB
25(OH)D3 SERPL-MCNC: 37.6 NG/ML
ALBUMIN SERPL ELPH-MCNC: 4.7 G/DL
ALP BLD-CCNC: 48 U/L
ALT SERPL-CCNC: 23 U/L
ANION GAP SERPL CALC-SCNC: 13 MMOL/L
APPEARANCE: CLEAR
AST SERPL-CCNC: 21 U/L
BASOPHILS # BLD AUTO: 0.03 K/UL
BASOPHILS NFR BLD AUTO: 0.5 %
BILIRUB SERPL-MCNC: 1.1 MG/DL
BILIRUBIN URINE: NEGATIVE
BLOOD URINE: NEGATIVE
BUN SERPL-MCNC: 13 MG/DL
CALCIUM SERPL-MCNC: 9.3 MG/DL
CHLORIDE SERPL-SCNC: 104 MMOL/L
CHOLEST SERPL-MCNC: 139 MG/DL
CHOLEST/HDLC SERPL: 3.9 RATIO
CO2 SERPL-SCNC: 24 MMOL/L
COLOR: YELLOW
CREAT SERPL-MCNC: 0.92 MG/DL
CREAT SPEC-SCNC: 164 MG/DL
EOSINOPHIL # BLD AUTO: 0.13 K/UL
EOSINOPHIL NFR BLD AUTO: 2.2 %
ERYTHROCYTE [SEDIMENTATION RATE] IN BLOOD BY WESTERGREN METHOD: 2 MM/HR
ESTIMATED AVERAGE GLUCOSE: 88 MG/DL
FOLATE SERPL-MCNC: 15.5 NG/ML
GGT SERPL-CCNC: 36 U/L
GLUCOSE QUALITATIVE U: NEGATIVE
GLUCOSE SERPL-MCNC: 92 MG/DL
HBA1C MFR BLD HPLC: 4.7 %
HCT VFR BLD CALC: 47.6 %
HDLC SERPL-MCNC: 36 MG/DL
HGB BLD-MCNC: 15 G/DL
IMM GRANULOCYTES NFR BLD AUTO: 0.3 %
IRON SATN MFR SERPL: 51 %
IRON SERPL-MCNC: 136 UG/DL
KETONES URINE: NEGATIVE
LDLC SERPL CALC-MCNC: 71 MG/DL
LEUKOCYTE ESTERASE URINE: NEGATIVE
LYMPHOCYTES # BLD AUTO: 1.57 K/UL
LYMPHOCYTES NFR BLD AUTO: 26.7 %
MAN DIFF?: NORMAL
MCHC RBC-ENTMCNC: 31 PG
MCHC RBC-ENTMCNC: 31.5 GM/DL
MCV RBC AUTO: 98.3 FL
MICROALBUMIN 24H UR DL<=1MG/L-MCNC: <1.2 MG/DL
MICROALBUMIN/CREAT 24H UR-RTO: NORMAL MG/G
MONOCYTES # BLD AUTO: 0.58 K/UL
MONOCYTES NFR BLD AUTO: 9.9 %
NEUTROPHILS # BLD AUTO: 3.55 K/UL
NEUTROPHILS NFR BLD AUTO: 60.4 %
NITRITE URINE: NEGATIVE
PH URINE: 6
PLATELET # BLD AUTO: 122 K/UL
POTASSIUM SERPL-SCNC: 5.3 MMOL/L
PROT SERPL-MCNC: 6.2 G/DL
PROTEIN URINE: NORMAL
PSA FREE FLD-MCNC: 50 %
PSA FREE SERPL-MCNC: 0.39 NG/ML
PSA SERPL-MCNC: 0.79 NG/ML
RBC # BLD: 4.84 M/UL
RBC # FLD: 11.6 %
SODIUM SERPL-SCNC: 141 MMOL/L
SPECIFIC GRAVITY URINE: 1.03
T3 SERPL-MCNC: 116 NG/DL
T4 FREE SERPL-MCNC: 1.2 NG/DL
TIBC SERPL-MCNC: 265 UG/DL
TRIGL SERPL-MCNC: 162 MG/DL
TSH SERPL-ACNC: 1.33 UIU/ML
UIBC SERPL-MCNC: 129 UG/DL
UROBILINOGEN URINE: NORMAL
VIT B12 SERPL-MCNC: 380 PG/ML
WBC # FLD AUTO: 5.88 K/UL

## 2020-10-10 LAB — HEMOCCULT STL QL IA: NEGATIVE

## 2020-10-21 ENCOUNTER — TRANSCRIPTION ENCOUNTER (OUTPATIENT)
Age: 65
End: 2020-10-21

## 2020-10-21 ENCOUNTER — NON-APPOINTMENT (OUTPATIENT)
Age: 65
End: 2020-10-21

## 2021-02-23 ENCOUNTER — NON-APPOINTMENT (OUTPATIENT)
Age: 66
End: 2021-02-23

## 2021-02-23 ENCOUNTER — APPOINTMENT (OUTPATIENT)
Dept: INTERNAL MEDICINE | Facility: CLINIC | Age: 66
End: 2021-02-23
Payer: MEDICARE

## 2021-02-23 VITALS
RESPIRATION RATE: 16 BRPM | DIASTOLIC BLOOD PRESSURE: 75 MMHG | SYSTOLIC BLOOD PRESSURE: 119 MMHG | OXYGEN SATURATION: 98 % | HEART RATE: 70 BPM | TEMPERATURE: 97.7 F | WEIGHT: 149 LBS | HEIGHT: 64 IN | BODY MASS INDEX: 25.44 KG/M2

## 2021-02-23 PROCEDURE — 99214 OFFICE O/P EST MOD 30 MIN: CPT | Mod: 25

## 2021-02-23 PROCEDURE — 93000 ELECTROCARDIOGRAM COMPLETE: CPT

## 2021-02-23 RX ORDER — PROMETHAZINE HYDROCHLORIDE AND DEXTROMETHORPHAN HYDROBROMIDE ORAL SOLUTION 15; 6.25 MG/5ML; MG/5ML
6.25-15 SOLUTION ORAL
Qty: 210 | Refills: 2 | Status: DISCONTINUED | COMMUNITY
Start: 2019-02-09 | End: 2021-02-23

## 2021-02-27 NOTE — ADDENDUM
[FreeTextEntry1] : PST results from 2/26/21 noted, discussed with the patient.\par \par Patient is medically optimized to the best at this time for the stated intervention.\par Patient is medically cleared.\par \par

## 2021-02-27 NOTE — ASSESSMENT
[Patient Intermediate Risk] : the patient is an intermediate risk [Optimized for Surgery Pending Laboratory Results] : the patient is optimized for surgery pending laboratory results [As per surgery] : as per surgery [Continue] : Continue medications as currently directed [Procedure Low Risk] : the procedure risk is low [FreeTextEntry4] : 66 year old male found to have stable Hypercholesterolemia, Cervical Radiculopathy, Thrombocytopenia, GERD, Allergic Rhinosinusitis, Psoriasis,with the current prescription regimen as recommended, diet and life style modifications, as counseled. Prior results reviewed, interpreted and discussed with the patient during today's examination, as appropriate. Follow up, treatment plan and tests, as ordered.\par \par Possible Eye Muscle Surgery for Strabismus, as per OPHTH.\par \par EKG showed NSR at the rate of 60 BPM, no new ST-T changes noted, compared to prior EKGs, as counseled.\par \par Total time spent : 30 minutes\par Including:\par Preparation prior to visit - Reviewing prior record, results of tests and Consultation Reports as applicable\par Conducting an appropriate H & P during today's encounter\par Appropriate orders for tests, medications and procedures, as applicable\par Counseling patient \par Note completion\par

## 2021-02-27 NOTE — HISTORY OF PRESENT ILLNESS
[No Pertinent Cardiac History] : no history of aortic stenosis, atrial fibrillation, coronary artery disease, recent myocardial infarction, or implantable device/pacemaker [No Pertinent Pulmonary History] : no history of asthma, COPD, sleep apnea, or smoking [No Adverse Anesthesia Reaction] : no adverse anesthesia reaction in self or family member [(Patient denies any chest pain, claudication, dyspnea on exertion, orthopnea, palpitations or syncope)] : Patient denies any chest pain, claudication, dyspnea on exertion, orthopnea, palpitations or syncope [Chronic Anticoagulation] : no chronic anticoagulation [Chronic Kidney Disease] : no chronic kidney disease [Diabetes] : no diabetes [FreeTextEntry1] : Eye Muscle Surgery [FreeTextEntry2] : 03/04/2021 [FreeTextEntry3] : Unknown [FreeTextEntry4] : 66 year old male with history of stable Hypercholesterolemia, Cervical Radiculopathy, Thrombocytopenia, GERD, Allergic Rhinosinusitis, Psoriasis, history as stated, presented for clearance evaluation prior to possible Eye Muscle Surgery for Strabismus, as per OPHTH.\par

## 2021-04-16 ENCOUNTER — NON-APPOINTMENT (OUTPATIENT)
Age: 66
End: 2021-04-16

## 2021-04-17 ENCOUNTER — APPOINTMENT (OUTPATIENT)
Dept: INTERNAL MEDICINE | Facility: CLINIC | Age: 66
End: 2021-04-17
Payer: MEDICARE

## 2021-04-17 VITALS
DIASTOLIC BLOOD PRESSURE: 76 MMHG | SYSTOLIC BLOOD PRESSURE: 116 MMHG | WEIGHT: 149 LBS | OXYGEN SATURATION: 98 % | BODY MASS INDEX: 25.44 KG/M2 | HEART RATE: 55 BPM | TEMPERATURE: 98.1 F | HEIGHT: 64 IN | RESPIRATION RATE: 16 BRPM

## 2021-04-17 DIAGNOSIS — E55.9 VITAMIN D DEFICIENCY, UNSPECIFIED: ICD-10-CM

## 2021-04-17 PROCEDURE — 99214 OFFICE O/P EST MOD 30 MIN: CPT

## 2021-04-19 LAB
ABO + RH PNL BLD: NORMAL
ALBUMIN SERPL ELPH-MCNC: 4.6 G/DL
ALP BLD-CCNC: 62 U/L
ALT SERPL-CCNC: 30 U/L
ANION GAP SERPL CALC-SCNC: 13 MMOL/L
APPEARANCE: CLEAR
APTT BLD: 33.3 SEC
AST SERPL-CCNC: 21 U/L
BACTERIA UR CULT: NORMAL
BASOPHILS # BLD AUTO: 0.03 K/UL
BASOPHILS NFR BLD AUTO: 0.5 %
BILIRUB SERPL-MCNC: 0.8 MG/DL
BILIRUBIN URINE: NEGATIVE
BLOOD URINE: NEGATIVE
BUN SERPL-MCNC: 14 MG/DL
CALCIUM SERPL-MCNC: 9.6 MG/DL
CHLORIDE SERPL-SCNC: 103 MMOL/L
CO2 SERPL-SCNC: 25 MMOL/L
COLOR: NORMAL
CREAT SERPL-MCNC: 0.95 MG/DL
EOSINOPHIL # BLD AUTO: 0.13 K/UL
EOSINOPHIL NFR BLD AUTO: 2.2 %
GGT SERPL-CCNC: 48 U/L
GLUCOSE QUALITATIVE U: NEGATIVE
GLUCOSE SERPL-MCNC: 105 MG/DL
HCT VFR BLD CALC: 48.3 %
HGB BLD-MCNC: 15.9 G/DL
IMM GRANULOCYTES NFR BLD AUTO: 0.3 %
INR PPP: 0.99 RATIO
KETONES URINE: NEGATIVE
LEUKOCYTE ESTERASE URINE: NEGATIVE
LYMPHOCYTES # BLD AUTO: 1.75 K/UL
LYMPHOCYTES NFR BLD AUTO: 29.7 %
MAN DIFF?: NORMAL
MCHC RBC-ENTMCNC: 31.3 PG
MCHC RBC-ENTMCNC: 32.9 GM/DL
MCV RBC AUTO: 95.1 FL
MONOCYTES # BLD AUTO: 0.49 K/UL
MONOCYTES NFR BLD AUTO: 8.3 %
NEUTROPHILS # BLD AUTO: 3.48 K/UL
NEUTROPHILS NFR BLD AUTO: 59 %
NITRITE URINE: NEGATIVE
PH URINE: 6
PLATELET # BLD AUTO: 149 K/UL
POTASSIUM SERPL-SCNC: 4.9 MMOL/L
PROT SERPL-MCNC: 6.8 G/DL
PROTEIN URINE: NEGATIVE
PT BLD: 11.8 SEC
RBC # BLD: 5.08 M/UL
RBC # FLD: 11.3 %
SODIUM SERPL-SCNC: 141 MMOL/L
SPECIFIC GRAVITY URINE: 1.02
UROBILINOGEN URINE: NORMAL
WBC # FLD AUTO: 5.9 K/UL

## 2021-04-19 RX ORDER — NEOMYCIN AND POLYMYXIN B SULFATES AND DEXAMETHASONE 3.5; 10000; 1 MG/G; [IU]/G; MG/G
3.5-10000-0.1 OINTMENT OPHTHALMIC
Qty: 4 | Refills: 0 | Status: DISCONTINUED | COMMUNITY
Start: 2021-03-02

## 2021-04-20 ENCOUNTER — NON-APPOINTMENT (OUTPATIENT)
Age: 66
End: 2021-04-20

## 2021-04-20 NOTE — ASSESSMENT
[Procedure Intermediate Risk] : the procedure risk is intermediate [Patient Intermediate Risk] : the patient is an intermediate risk [Optimized for Surgery Pending Laboratory Results] : the patient is optimized for surgery pending laboratory results [As per surgery] : as per surgery [Continue] : Continue medications as currently directed [FreeTextEntry4] : 66 year old male found to have stable Hypercholesterolemia, Thrombocytopenia, Vitamin D Deficiency, Allergic Rhinosinusitis, Arthritis, GERD without esophagitis,with the current prescription regimen as recommended, diet and life style modifications, as counseled. Prior results reviewed, interpreted and discussed with the patient during today's examination, as appropriate. Follow up, treatment plan and tests, as ordered.\par \par Possible C3-7 Laminoplasty with reconstruction, for Cervical Radiculopathy, as per NEURO SX.\par \par EKG from 02/23/2021 showed NSR at the rate of 60 BPM, no new ST-T changes noted, compared with prior EKGs.\par \par Total time spent : 35 minutes\par Including:\par Preparation prior to visit - Reviewing prior record, results of tests and Consultation Reports as applicable\par Conducting an appropriate H & P during today's encounter\par Appropriate orders for tests, medications and procedures, as applicable\par Counseling patient \par Note completion\par

## 2021-04-20 NOTE — ADDENDUM
[FreeTextEntry1] : PST results from 4/17/21 noted, discussed with the patient.\par \par Patient is medically optimized to the best at this time for the stated intervention.\par Patient is medically cleared.\par \par

## 2021-04-20 NOTE — HISTORY OF PRESENT ILLNESS
[No Pertinent Cardiac History] : no history of aortic stenosis, atrial fibrillation, coronary artery disease, recent myocardial infarction, or implantable device/pacemaker [No Pertinent Pulmonary History] : no history of asthma, COPD, sleep apnea, or smoking [No Adverse Anesthesia Reaction] : no adverse anesthesia reaction in self or family member [(Patient denies any chest pain, claudication, dyspnea on exertion, orthopnea, palpitations or syncope)] : Patient denies any chest pain, claudication, dyspnea on exertion, orthopnea, palpitations or syncope [Chronic Anticoagulation] : no chronic anticoagulation [Chronic Kidney Disease] : no chronic kidney disease [FreeTextEntry1] : C3-7 Laminoplasty with reconstruction [Diabetes] : no diabetes [FreeTextEntry2] : 05/12/2021 [FreeTextEntry3] : Dr. Srinivas Melgar [FreeTextEntry4] : 66 year old male with history of stable Hypercholesterolemia, Thrombocytopenia, Vitamin D Deficiency, Allergic Rhinosinusitis, Arthritis, GERD without esophagitis, history as stated, presented for clearance evaluation prior to possible C3-7 Laminoplasty with reconstruction, for Cervical Radiculopathy, as per NEURO SX.\par

## 2021-05-12 ENCOUNTER — NON-APPOINTMENT (OUTPATIENT)
Age: 66
End: 2021-05-12

## 2021-05-12 ENCOUNTER — RX RENEWAL (OUTPATIENT)
Age: 66
End: 2021-05-12

## 2021-05-17 ENCOUNTER — NON-APPOINTMENT (OUTPATIENT)
Age: 66
End: 2021-05-17

## 2021-05-20 ENCOUNTER — RX RENEWAL (OUTPATIENT)
Age: 66
End: 2021-05-20

## 2021-07-07 ENCOUNTER — NON-APPOINTMENT (OUTPATIENT)
Age: 66
End: 2021-07-07

## 2021-07-15 ENCOUNTER — APPOINTMENT (OUTPATIENT)
Dept: ORTHOPEDIC SURGERY | Facility: CLINIC | Age: 66
End: 2021-07-15
Payer: MEDICARE

## 2021-07-15 PROCEDURE — 20610 DRAIN/INJ JOINT/BURSA W/O US: CPT | Mod: LT

## 2021-07-15 PROCEDURE — 73564 X-RAY EXAM KNEE 4 OR MORE: CPT | Mod: LT

## 2021-07-15 PROCEDURE — 99214 OFFICE O/P EST MOD 30 MIN: CPT | Mod: 25

## 2021-07-18 LAB
B PERT IGG+IGM PNL SER: ABNORMAL
COLOR FLD: YELLOW
EOSINOPHIL # FLD MANUAL: 0 %
FLUID INTAKE SUBSTANCE CLASS: NORMAL
LYMPHOCYTES # FLD MANUAL: 12 %
MESOTHL CELL NFR FLD: 0 %
MONOS+MACROS NFR FLD MANUAL: 79 %
NEUTS SEG # FLD MANUAL: 9 %
NRBC # FLD: 0
RBC # FLD MANUAL: 6000 /UL
SYCRY CLARITY: ABNORMAL
SYCRY COLOR: YELLOW
SYCRY ID: ABNORMAL
SYCRY TUBE: NORMAL
TOTAL CELLS COUNTED FLD: 430 /UL
TUBE TYPE: NORMAL
UNIDENT CELLS NFR FLD MANUAL: 0 %
VARIANT LYMPHS # FLD MANUAL: 0 %

## 2021-07-18 NOTE — PROCEDURE
[de-identified] : Informed consent for the left knee aspiration was obtained. All risks, benefits and alternatives were discussed. These included but were not limited to bleeding, infection, allergic reaction and reaccumulation of fluid.  All questions were answered. A time out was performed. The left knee was prepped and draped in sterile fashion. Using sterile technique, the [SIDE] knee was aspirated of approximately left 40 cc of dark orange fluid using a 18-gauge needle. A sterile dressing was applied. Post aspiration instructions were reviewed. The patient tolerated the procedure well.\par

## 2021-07-18 NOTE — HISTORY OF PRESENT ILLNESS
[de-identified] : This is very nice 66-year-old gentleman experiencing 6 weeks of left knee pain, which is severe in intensity.  The pain became acutely worse on June 22, 2021.  He felt and heard a click in the knee.  He tried a cortisone shot on 6/21/2021 and then had a fall the next day and then he underwent a hyaluronic acid injection on 6/22/2021.  The pain substantially limits activities of daily living. Walking tolerance is reduced.  He does not take oral NSAIDs for this.  He does not use a cane or walker.  The patient denies any radiation of the pain to the feet and it is not associated with numbness, tingling, or weakness.  No fevers or chills.

## 2021-07-18 NOTE — DISCUSSION/SUMMARY
[de-identified] : This patient has an effusion in the knee as well as left knee osteoarthritis and degenerative meniscal tears.  An extensive discussion was conducted on the natural history of the disease and the variety of surgical and non-surgical options available to the patient including, but not limited to non-steroidal anti-inflammatory medications, steroid injections, physical therapy, maintenance of ideal body weight, and reduction of activity.  We discussed the possibility of this being gout or pseudogout or infection.  An aspiration of 40 cc of fluid was sent to the laboratory and will been analyzed for cell count, culture and Gram stain.  Crystal analysis will be performed.  Celebrex prescribed.  A neoprene sleeve knee brace recommended.  I will follow up with him regarding the aspiration results.\par

## 2021-07-18 NOTE — PHYSICAL EXAM
[de-identified] : Patient is well nourished, well-developed, in no acute distress, with appropriate mood and affect. The patient is oriented to time, place, and person. Respirations are even and unlabored. Gait evaluation does reveal a limp. There is no inguinal adenopathy. Examination of the contralateral knee shows normal range of motion, strength, no tenderness, and intact skin. The affected limb is well-perfused, without skin lesions, shows a grossly normal motor and sensory examination. Knee motion is significantly reduced and does cause significant pain. The knee moves from  degrees. The knee is stable within that range-of-motion to AP and ML stress. The alignment of the knee is 5 degrees varus.  2+ effusion in the knee.  Muscle strength is normal. Pedal pulses are palpable. Hip examination was negative.\par  [de-identified] : Long standing knee, AP knee, lateral knee, and patellar views of the left knee were ordered and taken in the office and demonstrate degenerative joint disease of the knee with joint space narrowing, osteophyte formation, and subchondral sclerosis.\par \par The brings with him an x-ray of the left knee including an AP, lateral and patellar view which demonstrates left knee osteoarthritis.\par \par The patient brings with him an MRI of the left knee.  Reviewed the MR imaging with the patient was demonstrates left knee osteoarthritis as well as degenerative meniscal tearing.

## 2021-07-20 ENCOUNTER — APPOINTMENT (OUTPATIENT)
Dept: ORTHOPEDIC SURGERY | Facility: CLINIC | Age: 66
End: 2021-07-20
Payer: MEDICARE

## 2021-07-20 PROCEDURE — 99214 OFFICE O/P EST MOD 30 MIN: CPT

## 2021-07-20 NOTE — REVIEW OF SYSTEMS
[Joint Pain] : joint pain [Joint Stiffness] : joint stiffness [Joint Swelling] : joint swelling [Negative] : Heme/Lymph [Arthralgia] : arthralgia

## 2021-07-20 NOTE — DISCUSSION/SUMMARY
[de-identified] : This patient has left knee osteoarthritis and degenerative meniscal tears and CPPD.  I think his flare of pain is mainly related to CPPD. He may need a TKA in the future but I would like to see if we can control his pain through rheumatological nonsurgical management first for CPPD. Labs reviewed. An extensive discussion was conducted on the natural history of the disease and the variety of surgical and non-surgical options available to the patient including, but not limited to non-steroidal anti-inflammatory medications, steroid injections, physical therapy, maintenance of ideal body weight, and reduction of activity.  Continue celebrex. Surgery can proceed for L TKA if/when CPPD resolved. Will f/u with rheumatology.

## 2021-07-20 NOTE — PHYSICAL EXAM
[de-identified] : Patient is well nourished, well-developed, in no acute distress, with appropriate mood and affect. The patient is oriented to time, place, and person. Respirations are even and unlabored. Gait evaluation does reveal a limp. There is no inguinal adenopathy. Examination of the contralateral knee shows normal range of motion, strength, no tenderness, and intact skin. The affected limb is well-perfused, without skin lesions, shows a grossly normal motor and sensory examination. Knee motion is significantly reduced and does cause significant pain. The knee moves from 0-125 degrees. The knee is stable within that range-of-motion to AP and ML stress. The alignment of the knee is 5 degrees varus.  No more effusion in the knee.  Muscle strength is normal. Pedal pulses are palpable. Hip examination was negative.\par  [de-identified] : Long standing knee, AP knee, lateral knee, and patellar views of the left knee were reviewed from the previous visit and demonstrate degenerative joint disease of the knee with joint space narrowing, osteophyte formation, and subchondral sclerosis.\par \par The brings with him an x-ray of the left knee including an AP, lateral and patellar view which demonstrates left knee osteoarthritis.\par \par The patient brings with him an MRI of the left knee.  Reviewed the MR imaging with the patient was demonstrates left knee osteoarthritis as well as degenerative meniscal tearing.

## 2021-07-20 NOTE — HISTORY OF PRESENT ILLNESS
[de-identified] : This is very nice 66-year-old gentleman experiencing 7 weeks of left knee pain, which is severe in intensity.  The pain became acutely worse on June 22, 2021.  He felt and heard a click in the knee.  He tried a cortisone shot on 6/21/2021 and then had a fall the next day and then he underwent a hyaluronic acid injection on 6/22/2021.  The pain substantially limits activities of daily living. Walking tolerance is reduced.  Celebrex mildly helpful. Bracing helping. Has not started PT.  The patient denies any radiation of the pain to the feet and it is not associated with numbness, tingling, or weakness.  No fevers or chills.

## 2021-07-29 ENCOUNTER — APPOINTMENT (OUTPATIENT)
Dept: RHEUMATOLOGY | Facility: CLINIC | Age: 66
End: 2021-07-29
Payer: MEDICARE

## 2021-07-29 VITALS
OXYGEN SATURATION: 98 % | DIASTOLIC BLOOD PRESSURE: 72 MMHG | TEMPERATURE: 97.5 F | BODY MASS INDEX: 25.61 KG/M2 | SYSTOLIC BLOOD PRESSURE: 118 MMHG | HEIGHT: 64 IN | WEIGHT: 150 LBS | RESPIRATION RATE: 16 BRPM | HEART RATE: 60 BPM

## 2021-07-29 PROCEDURE — 99204 OFFICE O/P NEW MOD 45 MIN: CPT

## 2021-07-30 LAB — BACTERIA FLD CULT: NORMAL

## 2021-08-04 ENCOUNTER — NON-APPOINTMENT (OUTPATIENT)
Age: 66
End: 2021-08-04

## 2021-08-04 LAB
ALBUMIN SERPL ELPH-MCNC: 4.5 G/DL
ALP BLD-CCNC: 69 U/L
ALT SERPL-CCNC: 25 U/L
ANION GAP SERPL CALC-SCNC: 14 MMOL/L
AST SERPL-CCNC: 20 U/L
B BURGDOR AB SER-IMP: NEGATIVE
B BURGDOR IGM PATRN SER IB-IMP: NEGATIVE
B BURGDOR18KD IGG SER QL IB: NORMAL
B BURGDOR23KD IGG SER QL IB: NORMAL
B BURGDOR23KD IGM SER QL IB: NORMAL
B BURGDOR28KD IGG SER QL IB: NORMAL
B BURGDOR30KD IGG SER QL IB: NORMAL
B BURGDOR31KD IGG SER QL IB: NORMAL
B BURGDOR39KD IGG SER QL IB: NORMAL
B BURGDOR39KD IGM SER QL IB: NORMAL
B BURGDOR41KD IGG SER QL IB: PRESENT
B BURGDOR41KD IGM SER QL IB: NORMAL
B BURGDOR45KD IGG SER QL IB: NORMAL
B BURGDOR58KD IGG SER QL IB: NORMAL
B BURGDOR66KD IGG SER QL IB: NORMAL
B BURGDOR93KD IGG SER QL IB: NORMAL
BASOPHILS # BLD AUTO: 0.03 K/UL
BASOPHILS NFR BLD AUTO: 0.5 %
BILIRUB SERPL-MCNC: 0.7 MG/DL
BUN SERPL-MCNC: 19 MG/DL
C TRACH RRNA SPEC QL NAA+PROBE: NOT DETECTED
CA-I SERPL-SCNC: 1.19 MMOL/L
CALCIUM SERPL-MCNC: 9.4 MG/DL
CALCIUM SERPL-MCNC: 9.4 MG/DL
CCP AB SER IA-ACNC: <8 UNITS
CHLORIDE SERPL-SCNC: 102 MMOL/L
CO2 SERPL-SCNC: 24 MMOL/L
CREAT SERPL-MCNC: 0.83 MG/DL
CRP SERPL-MCNC: 4 MG/L
EOSINOPHIL # BLD AUTO: 0.1 K/UL
EOSINOPHIL NFR BLD AUTO: 1.5 %
ERYTHROCYTE [SEDIMENTATION RATE] IN BLOOD BY WESTERGREN METHOD: 14 MM/HR
GLUCOSE SERPL-MCNC: 87 MG/DL
HCT VFR BLD CALC: 45.2 %
HGB BLD-MCNC: 14.1 G/DL
IMM GRANULOCYTES NFR BLD AUTO: 0.3 %
IRON SATN MFR SERPL: 25 %
IRON SERPL-MCNC: 73 UG/DL
LYMPHOCYTES # BLD AUTO: 1.62 K/UL
LYMPHOCYTES NFR BLD AUTO: 24.7 %
MAGNESIUM SERPL-MCNC: 2.3 MG/DL
MAN DIFF?: NORMAL
MCHC RBC-ENTMCNC: 30.5 PG
MCHC RBC-ENTMCNC: 31.2 GM/DL
MCV RBC AUTO: 97.6 FL
MONOCYTES # BLD AUTO: 0.66 K/UL
MONOCYTES NFR BLD AUTO: 10.1 %
N GONORRHOEA RRNA SPEC QL NAA+PROBE: NOT DETECTED
NEUTROPHILS # BLD AUTO: 4.13 K/UL
NEUTROPHILS NFR BLD AUTO: 62.9 %
PARATHYROID HORMONE INTACT: 35 PG/ML
PHOSPHATE SERPL-MCNC: 4.2 MG/DL
PLATELET # BLD AUTO: 161 K/UL
POTASSIUM SERPL-SCNC: 4.7 MMOL/L
PROT SERPL-MCNC: 6.6 G/DL
RBC # BLD: 4.63 M/UL
RBC # FLD: 11.9 %
RF+CCP IGG SER-IMP: NEGATIVE
RHEUMATOID FACT SER QL: <10 IU/ML
SODIUM SERPL-SCNC: 140 MMOL/L
SOURCE AMPLIFICATION: NORMAL
TIBC SERPL-MCNC: 290 UG/DL
TRANSFERRIN SERPL-MCNC: 205 MG/DL
TSH SERPL-ACNC: 1.3 UIU/ML
UIBC SERPL-MCNC: 218 UG/DL
URATE SERPL-MCNC: 5.7 MG/DL
WBC # FLD AUTO: 6.56 K/UL

## 2021-08-16 LAB — FUNGUS FLD CULT: NORMAL

## 2021-09-09 ENCOUNTER — APPOINTMENT (OUTPATIENT)
Dept: INTERNAL MEDICINE | Facility: CLINIC | Age: 66
End: 2021-09-09
Payer: MEDICARE

## 2021-09-09 ENCOUNTER — NON-APPOINTMENT (OUTPATIENT)
Age: 66
End: 2021-09-09

## 2021-09-09 VITALS
WEIGHT: 150 LBS | TEMPERATURE: 97.1 F | RESPIRATION RATE: 16 BRPM | SYSTOLIC BLOOD PRESSURE: 118 MMHG | HEART RATE: 57 BPM | DIASTOLIC BLOOD PRESSURE: 71 MMHG | HEIGHT: 64 IN | BODY MASS INDEX: 25.61 KG/M2 | OXYGEN SATURATION: 99 %

## 2021-09-09 DIAGNOSIS — M17.12 UNILATERAL PRIMARY OSTEOARTHRITIS, LEFT KNEE: ICD-10-CM

## 2021-09-09 PROCEDURE — 93000 ELECTROCARDIOGRAM COMPLETE: CPT

## 2021-09-09 PROCEDURE — 99214 OFFICE O/P EST MOD 30 MIN: CPT | Mod: 25

## 2021-09-09 RX ORDER — OXYCODONE 5 MG/1
5 TABLET ORAL EVERY 4 HOURS
Refills: 0 | Status: DISCONTINUED | COMMUNITY
End: 2021-09-09

## 2021-09-09 RX ORDER — LORATADINE 10 MG
17 TABLET,DISINTEGRATING ORAL DAILY
Refills: 0 | Status: DISCONTINUED | COMMUNITY
End: 2021-09-09

## 2021-09-09 RX ORDER — DIAZEPAM 5 MG/1
5 TABLET ORAL EVERY 8 HOURS
Refills: 0 | Status: DISCONTINUED | COMMUNITY
End: 2021-09-09

## 2021-09-09 RX ORDER — SENNOSIDES 8.6 MG TABLETS 8.6 MG/1
8.6 TABLET ORAL AT BEDTIME
Refills: 0 | Status: DISCONTINUED | COMMUNITY
End: 2021-09-09

## 2021-09-09 RX ORDER — CEPHALEXIN 500 MG/1
500 TABLET ORAL EVERY 6 HOURS
Refills: 0 | Status: DISCONTINUED | COMMUNITY
End: 2021-09-09

## 2021-09-14 NOTE — HISTORY OF PRESENT ILLNESS
[No Pertinent Cardiac History] : no history of aortic stenosis, atrial fibrillation, coronary artery disease, recent myocardial infarction, or implantable device/pacemaker [No Pertinent Pulmonary History] : no history of asthma, COPD, sleep apnea, or smoking [No Adverse Anesthesia Reaction] : no adverse anesthesia reaction in self or family member [(Patient denies any chest pain, claudication, dyspnea on exertion, orthopnea, palpitations or syncope)] : Patient denies any chest pain, claudication, dyspnea on exertion, orthopnea, palpitations or syncope [Chronic Anticoagulation] : no chronic anticoagulation [Chronic Kidney Disease] : no chronic kidney disease [Diabetes] : no diabetes [FreeTextEntry1] : Left - TKR [FreeTextEntry2] : 9/22/21 [FreeTextEntry3] : Dr. Rollins [FreeTextEntry4] : 66 year old male with history of stable Hypercholesterolemia, Cervical Radiculopathy, Thrombocytopenia, GERD, Allergic Rhinosinusitis, Psoriasis, history as stated, presented for clearance evaluation prior to possible Left - TKR, for Left Knee Arthritis, as per ORTHO.\par

## 2021-09-14 NOTE — ASSESSMENT
[FreeTextEntry4] : 66 year old male found to have stable Hypercholesterolemia, Cervical Radiculopathy, Thrombocytopenia, GERD, Allergic Rhinosinusitis, Psoriasis,with the current prescription regimen as recommended, diet and life style modifications, as counseled. Prior results reviewed, interpreted and discussed with the patient during today's examination, as appropriate. Follow up, treatment plan and tests, as ordered.\par \par Possible Left - TKR, for Left Knee Arthritis, as per ORTHO.\par \par EKG showed known sinus bradycardia at the rate of 56 BPM, LAD, non-sp ST-T changes noted.\par \par Total time spent : 35 minutes\par Including:\par Preparation prior to visit - Reviewing prior record, results of tests and Consultation Reports as applicable\par Conducting an appropriate H & P during today's encounter\par Appropriate orders for tests, medications and procedures, as applicable\par Counseling patient \par Note completion\par

## 2021-09-14 NOTE — ADDENDUM
[FreeTextEntry1] : PST results from 9/10/21 noted, discussed with the patient.\par Known stable Thrombocytopenia - stable, no pre-op intervention is indicated, as counseled.\par \par Patient is medically optimized to the best at this time for the stated intervention.\par Patient is medically cleared.\par \par

## 2021-09-22 ENCOUNTER — NON-APPOINTMENT (OUTPATIENT)
Age: 66
End: 2021-09-22

## 2021-09-23 ENCOUNTER — NON-APPOINTMENT (OUTPATIENT)
Age: 66
End: 2021-09-23

## 2021-12-22 DIAGNOSIS — U07.1 COVID-19: ICD-10-CM

## 2022-01-21 ENCOUNTER — APPOINTMENT (OUTPATIENT)
Dept: UROLOGY | Facility: CLINIC | Age: 67
End: 2022-01-21
Payer: MEDICARE

## 2022-01-21 VITALS — TEMPERATURE: 96.3 F

## 2022-01-21 VITALS — SYSTOLIC BLOOD PRESSURE: 130 MMHG | RESPIRATION RATE: 15 BRPM | DIASTOLIC BLOOD PRESSURE: 82 MMHG | HEART RATE: 88 BPM

## 2022-01-21 PROCEDURE — 99204 OFFICE O/P NEW MOD 45 MIN: CPT

## 2022-01-21 NOTE — ASSESSMENT
[FreeTextEntry1] : Very pleasant 67-year-old gentleman who presents for evaluation of BPH, nocturia, urinary urgency, right testicular atrophy, left renal cyst\par -MRI images reviewed demonstrating a small left renal cyst\par -Given incomplete characterization on this exam we will do an MRI renal mass protocol\par -We discussed Bosniak cyst classification system and likelihood of malignancy of different types of renal cysts\par -Discussed the natural history of BPH, as well as typical symptoms of an enlarged prostate. Discussed potential complications that could arise from BPH, including but not limited to urinary tract infections, bladder stones, and renal impairment.\par -Trial of Flomax\par -I discussed the risks, benefits, alternatives, and possible side effects of alpha blocker therapy with the patient, including but not limited to dizziness, lightheadedness, headache, blurred vision, retrograde ejaculation, and priapism with the patient. I also discussed that the patient must inform his ophthalmologist that he has taken an alpha blocker prior to undergoing cataract or glaucoma surgery.\par -PSA 0.79 from August 2020\par -Follow-up in 1 month

## 2022-01-21 NOTE — HISTORY OF PRESENT ILLNESS
[FreeTextEntry1] : Very pleasant 67-year-old gentleman who presents for evaluation of BPH with urinary obstruction, urinary urgency, nocturia, left renal cyst.  He recently underwent a lumbar MRI which demonstrated a left renal cyst.  Review of records demonstrates that he previously had an MRI in 2017 which demonstrated a smalller left renal cyst.  He denies flank pain.  He denies nausea or vomiting.  He reports nocturia up to 3 times per night.  This is very bothersome to him.  He reports urinary urgency during the day.  He also reports urinary urgency.  He does not smoke.  No family history of  malignancy, including prostate cancer.\par \par He does report a brother and his son both had testicular torsion.  He reports that he previously had a normal-sized testicle, however it is now atrophied significantly.

## 2022-01-21 NOTE — PHYSICAL EXAM
[General Appearance - Well Developed] : well developed [General Appearance - Well Nourished] : well nourished [Normal Appearance] : normal appearance [Well Groomed] : well groomed [General Appearance - In No Acute Distress] : no acute distress [Edema] : no peripheral edema [Respiration, Rhythm And Depth] : normal respiratory rhythm and effort [Exaggerated Use Of Accessory Muscles For Inspiration] : no accessory muscle use [Abdomen Soft] : soft [Abdomen Tenderness] : non-tender [Costovertebral Angle Tenderness] : no ~M costovertebral angle tenderness [Urethral Meatus] : meatus normal [Urinary Bladder Findings] : the bladder was normal on palpation [Scrotum] : the scrotum was normal [Testes Mass (___cm)] : there were no testicular masses [No Prostate Nodules] : no prostate nodules [FreeTextEntry1] : Normal left testicle, atrophied right testicle [Normal Station and Gait] : the gait and station were normal for the patient's age [] : no rash [No Focal Deficits] : no focal deficits [Oriented To Time, Place, And Person] : oriented to person, place, and time [Affect] : the affect was normal [Mood] : the mood was normal [Not Anxious] : not anxious [No Palpable Adenopathy] : no palpable adenopathy

## 2022-02-22 ENCOUNTER — APPOINTMENT (OUTPATIENT)
Dept: UROLOGY | Facility: CLINIC | Age: 67
End: 2022-02-22
Payer: MEDICARE

## 2022-02-22 PROCEDURE — 99214 OFFICE O/P EST MOD 30 MIN: CPT

## 2022-02-22 NOTE — HISTORY OF PRESENT ILLNESS
[FreeTextEntry1] : Very pleasant 67-year-old gentleman who presents for follow-up of renal cyst, urinary urgency, BPH.  He reports that tamsulosin significantly improved his urinary symptoms.  He reports nocturia x0-1 down from every 2 hours in the past.  He is very happy with this.  He still reports increased urinary frequency and urgency during the day.  He recently underwent a repeat MRI which demonstrated simple renal cysts.  No other complaints.

## 2022-02-22 NOTE — PHYSICAL EXAM
[General Appearance - Well Developed] : well developed [General Appearance - Well Nourished] : well nourished [Normal Appearance] : normal appearance [Well Groomed] : well groomed [General Appearance - In No Acute Distress] : no acute distress [Abdomen Soft] : soft [Abdomen Tenderness] : non-tender [Costovertebral Angle Tenderness] : no ~M costovertebral angle tenderness [Urethral Meatus] : meatus normal [Urinary Bladder Findings] : the bladder was normal on palpation [Scrotum] : the scrotum was normal [Testes Mass (___cm)] : there were no testicular masses [Edema] : no peripheral edema [] : no respiratory distress [Respiration, Rhythm And Depth] : normal respiratory rhythm and effort [Exaggerated Use Of Accessory Muscles For Inspiration] : no accessory muscle use [Oriented To Time, Place, And Person] : oriented to person, place, and time [Affect] : the affect was normal [Mood] : the mood was normal [Not Anxious] : not anxious [Normal Station and Gait] : the gait and station were normal for the patient's age [No Focal Deficits] : no focal deficits [No Palpable Adenopathy] : no palpable adenopathy [FreeTextEntry1] : Normal left testicle, atrophied right testicle

## 2022-02-22 NOTE — ASSESSMENT
[FreeTextEntry1] : Very pleasant 67-year-old gentleman who presents for follow-up of BPH, urinary urgency, renal cysts\par -[facility] demonstrating a 2.1 cm left renal cyst as well as a smaller right renal cyst with benign appearing features\par -Incidentally noted was a 3.3 cm hepatic subcapsular hemangioma\par –Patient will follow up with his primary care physician regarding hepatic hemangioma\par -Continue tamsulosin\par -Trial of Vesicare for urinary urgency\par -I discussed the risks, benefits, alternatives, and possible side effects of Vesicare therapy with the patient, including but not limited to urinary retention, dry mouth, dry eyes, constipation, and confusion.  Patient understands that he must call immediately should any of these symptoms occur\par -Follow-up in 1 month

## 2022-03-22 ENCOUNTER — APPOINTMENT (OUTPATIENT)
Dept: UROLOGY | Facility: CLINIC | Age: 67
End: 2022-03-22

## 2022-04-28 ENCOUNTER — RX RENEWAL (OUTPATIENT)
Age: 67
End: 2022-04-28

## 2022-05-02 ENCOUNTER — RX RENEWAL (OUTPATIENT)
Age: 67
End: 2022-05-02

## 2022-05-14 ENCOUNTER — RX RENEWAL (OUTPATIENT)
Age: 67
End: 2022-05-14

## 2022-05-26 ENCOUNTER — RX RENEWAL (OUTPATIENT)
Age: 67
End: 2022-05-26

## 2022-06-02 ENCOUNTER — RX RENEWAL (OUTPATIENT)
Age: 67
End: 2022-06-02

## 2022-07-12 ENCOUNTER — APPOINTMENT (OUTPATIENT)
Dept: INTERNAL MEDICINE | Facility: CLINIC | Age: 67
End: 2022-07-12

## 2022-07-12 VITALS
RESPIRATION RATE: 15 BRPM | WEIGHT: 150 LBS | HEART RATE: 72 BPM | DIASTOLIC BLOOD PRESSURE: 79 MMHG | SYSTOLIC BLOOD PRESSURE: 123 MMHG | TEMPERATURE: 98 F | BODY MASS INDEX: 25.61 KG/M2 | OXYGEN SATURATION: 95 % | HEIGHT: 64 IN

## 2022-07-12 PROCEDURE — 99214 OFFICE O/P EST MOD 30 MIN: CPT

## 2022-07-12 RX ORDER — BENZONATATE 100 MG/1
100 CAPSULE ORAL 3 TIMES DAILY
Qty: 30 | Refills: 0 | Status: DISCONTINUED | COMMUNITY
Start: 2021-12-22 | End: 2022-07-12

## 2022-07-12 RX ORDER — PREDNISONE 10 MG/1
10 TABLET ORAL
Qty: 21 | Refills: 0 | Status: DISCONTINUED | COMMUNITY
Start: 2019-12-09 | End: 2022-07-12

## 2022-07-12 RX ORDER — COLCHICINE 0.6 MG/1
0.6 TABLET ORAL
Qty: 30 | Refills: 3 | Status: DISCONTINUED | COMMUNITY
Start: 2021-07-29 | End: 2022-07-12

## 2022-07-12 RX ORDER — AMOXICILLIN 500 MG/1
500 TABLET, FILM COATED ORAL
Qty: 21 | Refills: 0 | Status: DISCONTINUED | COMMUNITY
Start: 2022-03-13

## 2022-07-12 RX ORDER — CELECOXIB 100 MG/1
100 CAPSULE ORAL TWICE DAILY
Qty: 60 | Refills: 2 | Status: DISCONTINUED | COMMUNITY
Start: 2021-07-15 | End: 2022-07-12

## 2022-07-12 NOTE — HISTORY OF PRESENT ILLNESS
[de-identified] : 67 year old  male patient with history of stable Hypercholesterolemia, Cervical Radiculopathy, BPH with Urinary Obstruction, GERD, Allergic Rhinosinusitis, Arthritis, Psoriasis, history as stated, presented for follow up examination. Patient is compliant with all medications. ROS as stated.\par

## 2022-07-12 NOTE — HEALTH RISK ASSESSMENT
[Never] : Never [No] : In the past 12 months have you used drugs other than those required for medical reasons? No [No falls in past year] : Patient reported no falls in the past year [0] : 2) Feeling down, depressed, or hopeless: Not at all (0) [de-identified] : URO [[id number]] : 0

## 2022-07-12 NOTE — ASSESSMENT
[FreeTextEntry1] : 67 year old male found to have stable Hypercholesterolemia, Cervical Radiculopathy, BPH with Urinary Obstruction, GERD, Allergic Rhinosinusitis, Arthritis, Psoriasis, Thrombocytopenia, Vitamin D Deficiency, with the current prescription regimen as recommended, diet and life style modifications, as counseled. Prior results reviewed, interpreted and discussed with the patient during today's examination, as appropriate. Follow up, treatment plan and tests, as ordered.\par \par Total time spent : 30 minutes\par Including:\par Preparation prior to visit - Reviewing prior record, results of tests and Consultation Reports as applicable\par Conducting an appropriate H & P during today's encounter\par Appropriate orders for tests, medications and procedures, as applicable\par Counseling patient \par Note completion\par

## 2022-07-13 LAB
25(OH)D3 SERPL-MCNC: 52.5 NG/ML
ALBUMIN SERPL ELPH-MCNC: 4.8 G/DL
ALP BLD-CCNC: 55 U/L
ALT SERPL-CCNC: 25 U/L
ANION GAP SERPL CALC-SCNC: 13 MMOL/L
APO LP(A) SERPL-MCNC: <9 NMOL/L
APPEARANCE: CLEAR
AST SERPL-CCNC: 21 U/L
BACTERIA: NEGATIVE
BASOPHILS # BLD AUTO: 0.03 K/UL
BASOPHILS NFR BLD AUTO: 0.7 %
BILIRUB SERPL-MCNC: 1.5 MG/DL
BILIRUBIN URINE: NEGATIVE
BLOOD URINE: NEGATIVE
BUN SERPL-MCNC: 14 MG/DL
CALCIUM SERPL-MCNC: 9.8 MG/DL
CHLORIDE SERPL-SCNC: 103 MMOL/L
CHOLEST SERPL-MCNC: 138 MG/DL
CO2 SERPL-SCNC: 26 MMOL/L
COLOR: NORMAL
CREAT SERPL-MCNC: 1 MG/DL
CREAT SPEC-SCNC: 46 MG/DL
EGFR: 82 ML/MIN/1.73M2
EOSINOPHIL # BLD AUTO: 0.11 K/UL
EOSINOPHIL NFR BLD AUTO: 2.4 %
ESTIMATED AVERAGE GLUCOSE: 91 MG/DL
FOLATE SERPL-MCNC: 17.6 NG/ML
GGT SERPL-CCNC: 25 U/L
GLUCOSE QUALITATIVE U: NEGATIVE
GLUCOSE SERPL-MCNC: 105 MG/DL
HBA1C MFR BLD HPLC: 4.8 %
HCT VFR BLD CALC: 44.9 %
HDLC SERPL-MCNC: 41 MG/DL
HGB BLD-MCNC: 14.7 G/DL
HYALINE CASTS: 0 /LPF
IMM GRANULOCYTES NFR BLD AUTO: 0.2 %
KETONES URINE: NEGATIVE
LDLC SERPL CALC-MCNC: 74 MG/DL
LEUKOCYTE ESTERASE URINE: NEGATIVE
LYMPHOCYTES # BLD AUTO: 1.11 K/UL
LYMPHOCYTES NFR BLD AUTO: 24.6 %
MAN DIFF?: NORMAL
MCHC RBC-ENTMCNC: 31.5 PG
MCHC RBC-ENTMCNC: 32.7 GM/DL
MCV RBC AUTO: 96.4 FL
MICROALBUMIN 24H UR DL<=1MG/L-MCNC: <1.2 MG/DL
MICROALBUMIN/CREAT 24H UR-RTO: NORMAL MG/G
MICROSCOPIC-UA: NORMAL
MONOCYTES # BLD AUTO: 0.41 K/UL
MONOCYTES NFR BLD AUTO: 9.1 %
NEUTROPHILS # BLD AUTO: 2.85 K/UL
NEUTROPHILS NFR BLD AUTO: 63 %
NITRITE URINE: NEGATIVE
NONHDLC SERPL-MCNC: 96 MG/DL
PH URINE: 6.5
PLATELET # BLD AUTO: 133 K/UL
POTASSIUM SERPL-SCNC: 5.2 MMOL/L
PROT SERPL-MCNC: 6.9 G/DL
PROTEIN URINE: NEGATIVE
PSA FREE FLD-MCNC: 61 %
PSA FREE SERPL-MCNC: 0.35 NG/ML
PSA SERPL-MCNC: 0.57 NG/ML
RBC # BLD: 4.66 M/UL
RBC # FLD: 11.6 %
RED BLOOD CELLS URINE: 0 /HPF
SODIUM SERPL-SCNC: 141 MMOL/L
SPECIFIC GRAVITY URINE: 1.01
SQUAMOUS EPITHELIAL CELLS: 0 /HPF
TRIGL SERPL-MCNC: 108 MG/DL
TSH SERPL-ACNC: 0.97 UIU/ML
UROBILINOGEN URINE: NORMAL
VIT B12 SERPL-MCNC: 374 PG/ML
WBC # FLD AUTO: 4.52 K/UL
WHITE BLOOD CELLS URINE: 0 /HPF

## 2022-07-19 ENCOUNTER — RX RENEWAL (OUTPATIENT)
Age: 67
End: 2022-07-19

## 2022-07-27 ENCOUNTER — RX RENEWAL (OUTPATIENT)
Age: 67
End: 2022-07-27

## 2022-07-28 ENCOUNTER — NON-APPOINTMENT (OUTPATIENT)
Age: 67
End: 2022-07-28

## 2022-08-10 ENCOUNTER — RX RENEWAL (OUTPATIENT)
Age: 67
End: 2022-08-10

## 2022-10-18 ENCOUNTER — NON-APPOINTMENT (OUTPATIENT)
Age: 67
End: 2022-10-18

## 2022-10-18 ENCOUNTER — APPOINTMENT (OUTPATIENT)
Dept: INTERNAL MEDICINE | Facility: CLINIC | Age: 67
End: 2022-10-18

## 2022-10-18 VITALS
HEART RATE: 76 BPM | BODY MASS INDEX: 26.29 KG/M2 | DIASTOLIC BLOOD PRESSURE: 82 MMHG | WEIGHT: 154 LBS | OXYGEN SATURATION: 95 % | HEIGHT: 64 IN | TEMPERATURE: 97.4 F | SYSTOLIC BLOOD PRESSURE: 124 MMHG

## 2022-10-18 DIAGNOSIS — H50.9 UNSPECIFIED STRABISMUS: ICD-10-CM

## 2022-10-18 PROCEDURE — 99214 OFFICE O/P EST MOD 30 MIN: CPT | Mod: 25

## 2022-10-18 PROCEDURE — 93000 ELECTROCARDIOGRAM COMPLETE: CPT

## 2022-10-19 LAB
ALBUMIN SERPL ELPH-MCNC: 4.7 G/DL
ALP BLD-CCNC: 51 U/L
ALT SERPL-CCNC: 27 U/L
ANION GAP SERPL CALC-SCNC: 12 MMOL/L
AST SERPL-CCNC: 19 U/L
BASOPHILS # BLD AUTO: 0.04 K/UL
BASOPHILS NFR BLD AUTO: 0.7 %
BILIRUB SERPL-MCNC: 0.9 MG/DL
BUN SERPL-MCNC: 16 MG/DL
CALCIUM SERPL-MCNC: 10 MG/DL
CHLORIDE SERPL-SCNC: 103 MMOL/L
CO2 SERPL-SCNC: 26 MMOL/L
CREAT SERPL-MCNC: 0.86 MG/DL
EGFR: 94 ML/MIN/1.73M2
EOSINOPHIL # BLD AUTO: 0.17 K/UL
EOSINOPHIL NFR BLD AUTO: 3.1 %
GGT SERPL-CCNC: 43 U/L
GLUCOSE SERPL-MCNC: 116 MG/DL
HCT VFR BLD CALC: 46 %
HGB BLD-MCNC: 15.3 G/DL
IMM GRANULOCYTES NFR BLD AUTO: 0.4 %
LYMPHOCYTES # BLD AUTO: 1.57 K/UL
LYMPHOCYTES NFR BLD AUTO: 29 %
MAN DIFF?: NORMAL
MCHC RBC-ENTMCNC: 31.9 PG
MCHC RBC-ENTMCNC: 33.3 GM/DL
MCV RBC AUTO: 96 FL
MONOCYTES # BLD AUTO: 0.5 K/UL
MONOCYTES NFR BLD AUTO: 9.2 %
NEUTROPHILS # BLD AUTO: 3.12 K/UL
NEUTROPHILS NFR BLD AUTO: 57.6 %
PLATELET # BLD AUTO: 127 K/UL
POTASSIUM SERPL-SCNC: 5.3 MMOL/L
PROT SERPL-MCNC: 6.6 G/DL
RBC # BLD: 4.79 M/UL
RBC # FLD: 11.5 %
SODIUM SERPL-SCNC: 141 MMOL/L
WBC # FLD AUTO: 5.42 K/UL

## 2022-10-19 NOTE — HISTORY OF PRESENT ILLNESS
[No Pertinent Cardiac History] : no history of aortic stenosis, atrial fibrillation, coronary artery disease, recent myocardial infarction, or implantable device/pacemaker [No Pertinent Pulmonary History] : no history of asthma, COPD, sleep apnea, or smoking [No Adverse Anesthesia Reaction] : no adverse anesthesia reaction in self or family member [(Patient denies any chest pain, claudication, dyspnea on exertion, orthopnea, palpitations or syncope)] : Patient denies any chest pain, claudication, dyspnea on exertion, orthopnea, palpitations or syncope [Chronic Anticoagulation] : no chronic anticoagulation [Chronic Kidney Disease] : no chronic kidney disease [Diabetes] : no diabetes [FreeTextEntry1] : Right Eye Sx [FreeTextEntry2] : TBD [FreeTextEntry3] : Unknown [FreeTextEntry4] : 67 year old male with history of stable Hypercholesterolemia, Cervical Radiculopathy, Thrombocytopenia, GERD, Allergic Rhinosinusitis, Psoriasis, history as stated, presented for clearance evaluation prior to possible Right Eye Sx, for Strabismus, as per Ophthalmologist.

## 2022-10-19 NOTE — ASSESSMENT
[Procedure Intermediate Risk] : the procedure risk is intermediate [Patient Intermediate Risk] : the patient is an intermediate risk [Optimized for Surgery Pending Laboratory Results] : the patient is optimized for surgery pending laboratory results [As per surgery] : as per surgery [Continue] : Continue medications as currently directed [FreeTextEntry4] : 67 year old male found to have stable Hypercholesterolemia, Cervical Radiculopathy, Thrombocytopenia, GERD, Allergic Rhinosinusitis, Psoriasis,with the current prescription regimen as recommended, diet and life style modifications, as counseled. Prior results reviewed, interpreted and discussed with the patient during today's examination, as appropriate. Follow up, treatment plan and tests, as ordered.\par \par Possible Right Eye Sx, for Strabismus, as per Ophthalmologist.\par \par EKG:\par Sinus Rhythm @ 70 BPM.\par Known LAD, LAHB, incomplete RBBB, no new ST-T changes noted.\par \par Total time spent : 30 minutes\par Including:\par Preparation prior to visit - Reviewing prior record, results of tests and Consultation Reports as applicable\par Conducting an appropriate H & P during today's encounter\par Appropriate orders for tests, medications and procedures, as applicable\par Counseling patient \par Note completion\par

## 2022-10-19 NOTE — ADDENDUM
[FreeTextEntry1] : PST results from 10/18/22 noted.\par \par Thrombocytopenia is stable, no presurgical intervention is recommended at this time.\par \par Patient is medically optimized to the best at this time for the stated surgical intervention.\par Patient is medically cleared.\par \par \par

## 2023-01-05 ENCOUNTER — RX CHANGE (OUTPATIENT)
Age: 68
End: 2023-01-05

## 2023-01-06 ENCOUNTER — APPOINTMENT (OUTPATIENT)
Dept: UROLOGY | Facility: CLINIC | Age: 68
End: 2023-01-06
Payer: MEDICARE

## 2023-01-06 ENCOUNTER — RX CHANGE (OUTPATIENT)
Age: 68
End: 2023-01-06

## 2023-01-06 VITALS
SYSTOLIC BLOOD PRESSURE: 125 MMHG | BODY MASS INDEX: 25.95 KG/M2 | HEART RATE: 79 BPM | TEMPERATURE: 97.3 F | WEIGHT: 152 LBS | HEIGHT: 64 IN | DIASTOLIC BLOOD PRESSURE: 76 MMHG

## 2023-01-06 PROCEDURE — 99214 OFFICE O/P EST MOD 30 MIN: CPT

## 2023-01-06 NOTE — ASSESSMENT
[FreeTextEntry1] : Mr. Og is a very pleasant 68 year old man here today for history of BPH, nocturia, renal cyst and urinary urgency.\par He reports feeling well since he was here last.\par Reports he is currently happy with his symptoms on tamsulosin and solifenacin.\par Denies any hematuria, dysuria or urinary retention.\par Continue tamsulosin - refills sent to pharmacy.\par Continue solifenacin - refills sent to pharmacy.\par Follow up with dermatology in regards to new mole.  We discussed that I am very concerned about the appearance of the mole demonstrating irregular borders and abnormal pigmentation.  We discussed the likelihood of melanoma.  Patient will follow-up with dermatology as advised\par RTO renal US 6 months for surveillance of renal cyst

## 2023-01-06 NOTE — PHYSICAL EXAM
[General Appearance - Well Developed] : well developed [General Appearance - Well Nourished] : well nourished [Normal Appearance] : normal appearance [Well Groomed] : well groomed [General Appearance - In No Acute Distress] : no acute distress [Abdomen Soft] : soft [Abdomen Tenderness] : non-tender [Costovertebral Angle Tenderness] : no ~M costovertebral angle tenderness [Edema] : no peripheral edema [] : no respiratory distress [Respiration, Rhythm And Depth] : normal respiratory rhythm and effort [Exaggerated Use Of Accessory Muscles For Inspiration] : no accessory muscle use [Oriented To Time, Place, And Person] : oriented to person, place, and time [Affect] : the affect was normal [Mood] : the mood was normal [Not Anxious] : not anxious [Normal Station and Gait] : the gait and station were normal for the patient's age [No Focal Deficits] : no focal deficits [No Palpable Adenopathy] : no palpable adenopathy [Urinary Bladder Findings] : the bladder was normal on palpation [FreeTextEntry1] : Mole overlying left flank, with irregular borders and irregular pigmentation

## 2023-01-06 NOTE — HISTORY OF PRESENT ILLNESS
[None] : None [FreeTextEntry1] : Mr. Og is a very pleasant 68 year old man here today for history of BPH, renal cyst and urinary urgency.\par He reports feeling well since he was here last.\par Reports he is currently happy with his symptoms on tamsulosin and solifenacin.\par Denies any hematuria, dysuria or urinary retention.\par \par Prior MRI demonstrates Bosniak 1 renal cysts.

## 2023-01-11 DIAGNOSIS — M25.522 PAIN IN LEFT ELBOW: ICD-10-CM

## 2023-01-11 DIAGNOSIS — M79.602 PAIN IN LEFT ARM: ICD-10-CM

## 2023-01-25 DIAGNOSIS — M54.2 CERVICALGIA: ICD-10-CM

## 2023-03-14 ENCOUNTER — APPOINTMENT (OUTPATIENT)
Dept: INTERNAL MEDICINE | Facility: CLINIC | Age: 68
End: 2023-03-14
Payer: MEDICARE

## 2023-04-01 ENCOUNTER — RX RENEWAL (OUTPATIENT)
Age: 68
End: 2023-04-01

## 2023-05-11 ENCOUNTER — APPOINTMENT (OUTPATIENT)
Dept: INTERNAL MEDICINE | Facility: CLINIC | Age: 68
End: 2023-05-11
Payer: MEDICARE

## 2023-05-11 VITALS
HEIGHT: 64 IN | TEMPERATURE: 98 F | BODY MASS INDEX: 26.63 KG/M2 | RESPIRATION RATE: 16 BRPM | WEIGHT: 156 LBS | OXYGEN SATURATION: 96 % | DIASTOLIC BLOOD PRESSURE: 74 MMHG | SYSTOLIC BLOOD PRESSURE: 115 MMHG | HEART RATE: 82 BPM

## 2023-05-11 DIAGNOSIS — K21.9 GASTRO-ESOPHAGEAL REFLUX DISEASE W/OUT ESOPHAGITIS: ICD-10-CM

## 2023-05-11 DIAGNOSIS — L40.9 PSORIASIS, UNSPECIFIED: ICD-10-CM

## 2023-05-11 DIAGNOSIS — L98.9 DISORDER OF THE SKIN AND SUBCUTANEOUS TISSUE, UNSPECIFIED: ICD-10-CM

## 2023-05-11 PROCEDURE — 99214 OFFICE O/P EST MOD 30 MIN: CPT

## 2023-05-11 NOTE — ASSESSMENT
[FreeTextEntry1] : 68 year old male found to have stable Hypercholesterolemia, Cervical Radiculopathy, BPH with Urinary Obstruction, GERD, Allergic Rhinosinusitis, Arthritis, Psoriasis, Thrombocytopenia, Vitamin D Deficiency,,with the current prescription regimen as recommended, diet and life style modifications, as counseled. Prior results reviewed, interpreted and discussed with the patient during today's examination, as appropriate. Follow up, treatment plan and tests, as ordered.\par \par Total time spent : 30 minutes\par Including:\par Preparation prior to visit - Reviewing prior record, results of tests and Consultation Reports as applicable\par Conducting an appropriate H & P during today's encounter\par Appropriate orders for tests, medications and procedures, as applicable\par Counseling patient \par Note completion\par

## 2023-05-11 NOTE — HISTORY OF PRESENT ILLNESS
[de-identified] : 68 year old  male patient with history of stable Hypercholesterolemia, Cervical Radiculopathy, BPH with Urinary Obstruction, GERD, Allergic Rhinosinusitis, Arthritis, Psoriasis, Thrombocytopenia, Vitamin D Deficiency, history as stated, presented for follow up examination. Patient is compliant with all medications. ROS as stated.\par

## 2023-05-31 ENCOUNTER — RX RENEWAL (OUTPATIENT)
Age: 68
End: 2023-05-31

## 2023-07-07 ENCOUNTER — APPOINTMENT (OUTPATIENT)
Dept: UROLOGY | Facility: CLINIC | Age: 68
End: 2023-07-07
Payer: MEDICARE

## 2023-07-07 DIAGNOSIS — R35.1 NOCTURIA: ICD-10-CM

## 2023-07-07 DIAGNOSIS — N28.1 CYST OF KIDNEY, ACQUIRED: ICD-10-CM

## 2023-07-07 DIAGNOSIS — R39.15 URGENCY OF URINATION: ICD-10-CM

## 2023-07-07 PROCEDURE — 99214 OFFICE O/P EST MOD 30 MIN: CPT

## 2023-07-07 PROCEDURE — 76775 US EXAM ABDO BACK WALL LIM: CPT

## 2023-07-07 NOTE — HISTORY OF PRESENT ILLNESS
[FreeTextEntry1] : Very pleasant 68-year-old gentleman who presents for follow-up of BPH, urinary urgency, complex renal cyst.  He underwent a renal ultrasound today which demonstrated a simple appearing left renal cyst.  He continues to report that tamsulosin and Solifenacin significantly improve his urinary symptoms.  He wishes to continue taking these medications.  No other complaints.

## 2023-07-07 NOTE — ASSESSMENT
[FreeTextEntry1] : Very pleasant 68-year-old gentleman who presents for follow-up of BPH, urinary urgency, renal cysts\par -Renal ultrasound images reviewed with the patient today demonstrating a simple appearing left renal cyst\par -We discussed the benign nature of renal cysts\par -PSA last year 0.5; repeat\par -A1c\par -CMP\par -Continue Solifenacin\par -Continue tamsulosin\par

## 2023-07-10 ENCOUNTER — RX RENEWAL (OUTPATIENT)
Age: 68
End: 2023-07-10

## 2023-07-10 RX ORDER — SOLIFENACIN SUCCINATE 10 MG/1
10 TABLET ORAL
Qty: 90 | Refills: 3 | Status: ACTIVE | COMMUNITY
Start: 2022-02-22 | End: 1900-01-01

## 2023-07-10 RX ORDER — TAMSULOSIN HYDROCHLORIDE 0.4 MG/1
0.4 CAPSULE ORAL
Qty: 90 | Refills: 3 | Status: ACTIVE | COMMUNITY
Start: 2022-01-21 | End: 1900-01-01

## 2023-07-12 LAB — HEMOCCULT STL QL IA: NEGATIVE

## 2023-07-19 ENCOUNTER — APPOINTMENT (OUTPATIENT)
Dept: CARDIOLOGY | Facility: CLINIC | Age: 68
End: 2023-07-19

## 2023-08-27 ENCOUNTER — RX RENEWAL (OUTPATIENT)
Age: 68
End: 2023-08-27

## 2023-08-27 RX ORDER — ERGOCALCIFEROL 1.25 MG/1
1.25 MG CAPSULE, LIQUID FILLED ORAL
Qty: 13 | Refills: 3 | Status: ACTIVE | COMMUNITY
Start: 2018-05-05 | End: 1900-01-01

## 2023-10-03 ENCOUNTER — NON-APPOINTMENT (OUTPATIENT)
Age: 68
End: 2023-10-03

## 2024-01-24 RX ORDER — FLUOCINONIDE 0.5 MG/G
0.05 CREAM TOPICAL TWICE DAILY
Qty: 1 | Refills: 5 | Status: ACTIVE | COMMUNITY
Start: 2021-01-19 | End: 1900-01-01

## 2024-04-04 ENCOUNTER — APPOINTMENT (OUTPATIENT)
Dept: INTERNAL MEDICINE | Facility: CLINIC | Age: 69
End: 2024-04-04
Payer: MEDICARE

## 2024-04-04 VITALS
BODY MASS INDEX: 26.46 KG/M2 | HEART RATE: 81 BPM | RESPIRATION RATE: 16 BRPM | OXYGEN SATURATION: 96 % | DIASTOLIC BLOOD PRESSURE: 83 MMHG | SYSTOLIC BLOOD PRESSURE: 131 MMHG | TEMPERATURE: 98 F | HEIGHT: 64 IN | WEIGHT: 155 LBS

## 2024-04-04 DIAGNOSIS — D69.6 THROMBOCYTOPENIA, UNSPECIFIED: ICD-10-CM

## 2024-04-04 DIAGNOSIS — J45.909 UNSPECIFIED ASTHMA, UNCOMPLICATED: ICD-10-CM

## 2024-04-04 DIAGNOSIS — E78.00 PURE HYPERCHOLESTEROLEMIA, UNSPECIFIED: ICD-10-CM

## 2024-04-04 DIAGNOSIS — J30.9 ALLERGIC RHINITIS, UNSPECIFIED: ICD-10-CM

## 2024-04-04 DIAGNOSIS — N13.8 BENIGN PROSTATIC HYPERPLASIA WITH LOWER URINARY TRACT SYMPMS: ICD-10-CM

## 2024-04-04 DIAGNOSIS — N40.1 BENIGN PROSTATIC HYPERPLASIA WITH LOWER URINARY TRACT SYMPMS: ICD-10-CM

## 2024-04-04 DIAGNOSIS — M54.12 RADICULOPATHY, CERVICAL REGION: ICD-10-CM

## 2024-04-04 PROCEDURE — 99214 OFFICE O/P EST MOD 30 MIN: CPT

## 2024-04-04 RX ORDER — BENZONATATE 100 MG/1
100 CAPSULE ORAL 3 TIMES DAILY
Qty: 30 | Refills: 0 | Status: DISCONTINUED | COMMUNITY
Start: 2023-10-04 | End: 2024-04-04

## 2024-04-04 RX ORDER — AZITHROMYCIN 250 MG/1
250 TABLET, FILM COATED ORAL
Qty: 6 | Refills: 0 | Status: ACTIVE | COMMUNITY
Start: 2023-10-04 | End: 1900-01-01

## 2024-04-04 RX ORDER — PREDNISONE 10 MG/1
10 TABLET ORAL
Qty: 21 | Refills: 0 | Status: DISCONTINUED | COMMUNITY
Start: 2023-10-04 | End: 2024-04-04

## 2024-04-04 RX ORDER — PROMETHAZINE HYDROCHLORIDE AND DEXTROMETHORPHAN HYDROBROMIDE ORAL SOLUTION 15; 6.25 MG/5ML; MG/5ML
6.25-15 SOLUTION ORAL
Qty: 210 | Refills: 2 | Status: ACTIVE | COMMUNITY
Start: 2021-12-22 | End: 1900-01-01

## 2024-04-04 RX ORDER — DEXLANSOPRAZOLE 30 MG/1
30 CAPSULE, DELAYED RELEASE ORAL DAILY
Qty: 30 | Refills: 5 | Status: DISCONTINUED | COMMUNITY
Start: 2022-07-12 | End: 2024-04-04

## 2024-04-04 NOTE — ASSESSMENT
[FreeTextEntry1] : 69 year old male found to have stable Hypercholesterolemia, Cervical Radiculopathy, BPH with Urinary Obstruction, GERD, Allergic Rhinosinusitis, Arthritis, Psoriasis, Thrombocytopenia, Vitamin D Deficiency,,with the current prescription regimen as recommended, diet and life style modifications, as counseled. Prior results reviewed, interpreted and discussed with the patient during today's examination, as appropriate. Follow up, treatment plan and tests, as ordered.  Current symptoms are consistent with symptomatic Reactive Airway Disease, Promethazine DM and Azithromycin as prescribed. If symptoms get worse, consider adding Prednisone, as counseled.  Patient was instructed to do home testing to rule out possible COVID-19 infection.  Total time spent : 30 minutes Including: Preparation prior to visit - Reviewing prior record, results of tests and Consultation Reports as applicable Conducting an appropriate H & P during today's encounter Appropriate orders for tests, medications and procedures, as applicable Counseling patient  Note completion

## 2024-04-04 NOTE — HEALTH RISK ASSESSMENT
[No] : In the past 12 months have you used drugs other than those required for medical reasons? No [No falls in past year] : Patient reported no falls in the past year [Never] : Never [de-identified] : PT

## 2024-04-04 NOTE — PHYSICAL EXAM
[TextEntry] : PULMONARY:  No respiratory distress and Bilateral lungs with scattered ronchi/wheezing noted. HEART:  Heart rate was normal and rhythm regular, normal S1 and S2, no gallops/murmur/pericardial rub. VASCULAR:  No peripheral edema. ABDOMEN:  Normal bowel sounds, soft, non-tender, no hepatosplenomegaly and no abdominal mass palpated. NEUROLOGICAL: Normal gait and reflexes, no focal deficits.

## 2024-04-04 NOTE — HISTORY OF PRESENT ILLNESS
[de-identified] : 69 year old  male patient with history of stable Hypercholesterolemia, Cervical Radiculopathy, BPH with Urinary Obstruction, GERD, Allergic Rhinosinusitis, Arthritis, Psoriasis, Thrombocytopenia, Vitamin D Deficiency, history as stated, presented for follow up examination with complaint of cough associated with chest congestion and sinus pressure for the past 2 days, no wheezing at this time. Patient is compliant with all medications. ROS as stated.

## 2024-04-04 NOTE — REVIEW OF SYSTEMS
[TextEntry] : CARDIOVASCULAR: Negative RESPIRATORY: As per HPI GASTROINTESTINAL: Negative NEUROLOGICAL: Negative

## 2024-04-29 ENCOUNTER — RX RENEWAL (OUTPATIENT)
Age: 69
End: 2024-04-29

## 2024-04-29 RX ORDER — DEXLANSOPRAZOLE 30 MG/1
30 CAPSULE, DELAYED RELEASE ORAL
Qty: 30 | Refills: 5 | Status: ACTIVE | COMMUNITY
Start: 2023-04-01 | End: 1900-01-01

## 2024-05-29 ENCOUNTER — RX RENEWAL (OUTPATIENT)
Age: 69
End: 2024-05-29

## 2024-07-09 ENCOUNTER — APPOINTMENT (OUTPATIENT)
Dept: UROLOGY | Facility: CLINIC | Age: 69
End: 2024-07-09

## 2024-08-28 ENCOUNTER — RX RENEWAL (OUTPATIENT)
Age: 69
End: 2024-08-28

## 2024-10-20 ENCOUNTER — NON-APPOINTMENT (OUTPATIENT)
Age: 69
End: 2024-10-20

## 2024-11-16 ENCOUNTER — RX RENEWAL (OUTPATIENT)
Age: 69
End: 2024-11-16

## 2024-11-20 ENCOUNTER — APPOINTMENT (OUTPATIENT)
Dept: UROLOGY | Facility: CLINIC | Age: 69
End: 2024-11-20
Payer: MEDICARE

## 2024-11-20 VITALS
OXYGEN SATURATION: 98 % | TEMPERATURE: 96.8 F | BODY MASS INDEX: 26.46 KG/M2 | HEIGHT: 64 IN | HEART RATE: 73 BPM | DIASTOLIC BLOOD PRESSURE: 84 MMHG | SYSTOLIC BLOOD PRESSURE: 143 MMHG | WEIGHT: 155 LBS

## 2024-11-20 DIAGNOSIS — N40.1 BENIGN PROSTATIC HYPERPLASIA WITH LOWER URINARY TRACT SYMPMS: ICD-10-CM

## 2024-11-20 DIAGNOSIS — R39.15 URGENCY OF URINATION: ICD-10-CM

## 2024-11-20 DIAGNOSIS — R35.1 NOCTURIA: ICD-10-CM

## 2024-11-20 DIAGNOSIS — N13.8 BENIGN PROSTATIC HYPERPLASIA WITH LOWER URINARY TRACT SYMPMS: ICD-10-CM

## 2024-11-20 PROCEDURE — 99214 OFFICE O/P EST MOD 30 MIN: CPT

## 2024-11-20 PROCEDURE — G2211 COMPLEX E/M VISIT ADD ON: CPT

## 2024-11-20 RX ORDER — FINASTERIDE 5 MG/1
5 TABLET, FILM COATED ORAL DAILY
Qty: 90 | Refills: 0 | Status: ACTIVE | COMMUNITY
Start: 2024-11-20 | End: 1900-01-01

## 2024-11-21 LAB
APPEARANCE: CLEAR
BACTERIA: NEGATIVE /HPF
BILIRUBIN URINE: NEGATIVE
BLOOD URINE: NEGATIVE
CAST: 0 /LPF
COLOR: YELLOW
EPITHELIAL CELLS: 0 /HPF
GLUCOSE QUALITATIVE U: NEGATIVE MG/DL
KETONES URINE: NEGATIVE MG/DL
LEUKOCYTE ESTERASE URINE: NEGATIVE
MICROSCOPIC-UA: NORMAL
NITRITE URINE: NEGATIVE
PH URINE: 5.5
PROTEIN URINE: NEGATIVE MG/DL
RED BLOOD CELLS URINE: 1 /HPF
SPECIFIC GRAVITY URINE: 1.01
UROBILINOGEN URINE: 0.2 MG/DL
WHITE BLOOD CELLS URINE: 0 /HPF

## 2024-11-22 ENCOUNTER — NON-APPOINTMENT (OUTPATIENT)
Age: 69
End: 2024-11-22

## 2024-11-22 LAB
ANION GAP SERPL CALC-SCNC: 14 MMOL/L
BACTERIA UR CULT: NORMAL
BUN SERPL-MCNC: 20 MG/DL
CALCIUM SERPL-MCNC: 9.5 MG/DL
CHLORIDE SERPL-SCNC: 101 MMOL/L
CO2 SERPL-SCNC: 25 MMOL/L
CREAT SERPL-MCNC: 0.91 MG/DL
EGFR: 91 ML/MIN/1.73M2
ESTIMATED AVERAGE GLUCOSE: 94 MG/DL
GLUCOSE SERPL-MCNC: 52 MG/DL
HBA1C MFR BLD HPLC: 4.9 %
POTASSIUM SERPL-SCNC: 6.2 MMOL/L
PSA SERPL-MCNC: 1.25 NG/ML
SODIUM SERPL-SCNC: 139 MMOL/L

## 2024-12-05 ENCOUNTER — APPOINTMENT (OUTPATIENT)
Dept: INTERNAL MEDICINE | Facility: CLINIC | Age: 69
End: 2024-12-05
Payer: MEDICARE

## 2024-12-05 VITALS
HEART RATE: 80 BPM | SYSTOLIC BLOOD PRESSURE: 113 MMHG | TEMPERATURE: 98.3 F | BODY MASS INDEX: 26.12 KG/M2 | HEIGHT: 64 IN | WEIGHT: 153 LBS | OXYGEN SATURATION: 98 % | DIASTOLIC BLOOD PRESSURE: 76 MMHG | RESPIRATION RATE: 16 BRPM

## 2024-12-05 DIAGNOSIS — J45.909 UNSPECIFIED ASTHMA, UNCOMPLICATED: ICD-10-CM

## 2024-12-05 DIAGNOSIS — J30.9 ALLERGIC RHINITIS, UNSPECIFIED: ICD-10-CM

## 2024-12-05 DIAGNOSIS — E78.00 PURE HYPERCHOLESTEROLEMIA, UNSPECIFIED: ICD-10-CM

## 2024-12-05 DIAGNOSIS — M54.12 RADICULOPATHY, CERVICAL REGION: ICD-10-CM

## 2024-12-05 PROCEDURE — 99213 OFFICE O/P EST LOW 20 MIN: CPT

## 2025-01-06 ENCOUNTER — RX RENEWAL (OUTPATIENT)
Age: 70
End: 2025-01-06

## 2025-02-15 ENCOUNTER — RX RENEWAL (OUTPATIENT)
Age: 70
End: 2025-02-15

## 2025-04-11 ENCOUNTER — RX RENEWAL (OUTPATIENT)
Age: 70
End: 2025-04-11

## 2025-04-13 ENCOUNTER — NON-APPOINTMENT (OUTPATIENT)
Age: 70
End: 2025-04-13

## 2025-05-23 ENCOUNTER — RX RENEWAL (OUTPATIENT)
Age: 70
End: 2025-05-23

## 2025-05-29 ENCOUNTER — RX RENEWAL (OUTPATIENT)
Age: 70
End: 2025-05-29

## 2025-07-07 ENCOUNTER — RX RENEWAL (OUTPATIENT)
Age: 70
End: 2025-07-07

## 2025-08-05 ENCOUNTER — RX RENEWAL (OUTPATIENT)
Age: 70
End: 2025-08-05

## 2025-09-14 PROBLEM — M54.50 LOWER BACK PAIN: Status: ACTIVE | Noted: 2025-09-14

## 2025-09-20 DIAGNOSIS — M54.9 DORSALGIA, UNSPECIFIED: ICD-10-CM
